# Patient Record
Sex: FEMALE | Race: WHITE | NOT HISPANIC OR LATINO | Employment: OTHER | URBAN - METROPOLITAN AREA
[De-identification: names, ages, dates, MRNs, and addresses within clinical notes are randomized per-mention and may not be internally consistent; named-entity substitution may affect disease eponyms.]

---

## 2017-01-16 ENCOUNTER — RADIANT APPOINTMENT (OUTPATIENT)
Dept: BONE DENSITY | Facility: CLINIC | Age: 67
End: 2017-01-16
Attending: INTERNAL MEDICINE
Payer: COMMERCIAL

## 2017-01-16 DIAGNOSIS — Z00.00 ENCOUNTER FOR ROUTINE ADULT HEALTH EXAMINATION WITHOUT ABNORMAL FINDINGS: ICD-10-CM

## 2017-01-16 PROCEDURE — 77080 DXA BONE DENSITY AXIAL: CPT | Performed by: INTERNAL MEDICINE

## 2017-11-13 ENCOUNTER — HOSPITAL ENCOUNTER (OUTPATIENT)
Dept: MAMMOGRAPHY | Facility: CLINIC | Age: 67
Discharge: HOME | End: 2017-11-13
Attending: OBSTETRICS & GYNECOLOGY | Admitting: OBSTETRICS & GYNECOLOGY
Payer: COMMERCIAL

## 2017-11-13 DIAGNOSIS — Z12.31 VISIT FOR SCREENING MAMMOGRAM: ICD-10-CM

## 2017-11-13 PROCEDURE — G0202 SCR MAMMO BI INCL CAD: HCPCS

## 2017-12-16 DIAGNOSIS — I10 ESSENTIAL HYPERTENSION: ICD-10-CM

## 2017-12-16 RX ORDER — TRIAMTERENE AND HYDROCHLOROTHIAZIDE 37.5; 25 MG/1; MG/1
CAPSULE ORAL
Qty: 90 CAPSULE | Refills: 2 | Status: CANCELLED | OUTPATIENT
Start: 2017-12-16

## 2017-12-18 ENCOUNTER — OFFICE VISIT (OUTPATIENT)
Dept: INTERNAL MEDICINE | Facility: CLINIC | Age: 67
End: 2017-12-18
Payer: COMMERCIAL

## 2017-12-18 VITALS
HEIGHT: 58 IN | BODY MASS INDEX: 28.55 KG/M2 | SYSTOLIC BLOOD PRESSURE: 138 MMHG | WEIGHT: 136 LBS | TEMPERATURE: 97.6 F | OXYGEN SATURATION: 96 % | HEART RATE: 64 BPM | DIASTOLIC BLOOD PRESSURE: 60 MMHG

## 2017-12-18 DIAGNOSIS — M79.674 PAIN IN RIGHT TOE(S): ICD-10-CM

## 2017-12-18 DIAGNOSIS — I10 BENIGN ESSENTIAL HYPERTENSION: ICD-10-CM

## 2017-12-18 DIAGNOSIS — K64.9 HEMORRHOIDS, UNSPECIFIED HEMORRHOID TYPE: ICD-10-CM

## 2017-12-18 DIAGNOSIS — E78.5 HYPERLIPIDEMIA LDL GOAL <130: ICD-10-CM

## 2017-12-18 DIAGNOSIS — Z00.00 ENCOUNTER FOR ROUTINE ADULT HEALTH EXAMINATION WITHOUT ABNORMAL FINDINGS: Primary | ICD-10-CM

## 2017-12-18 PROCEDURE — 82043 UR ALBUMIN QUANTITATIVE: CPT | Performed by: INTERNAL MEDICINE

## 2017-12-18 PROCEDURE — 99397 PER PM REEVAL EST PAT 65+ YR: CPT | Performed by: INTERNAL MEDICINE

## 2017-12-18 PROCEDURE — 80061 LIPID PANEL: CPT | Performed by: INTERNAL MEDICINE

## 2017-12-18 PROCEDURE — 36415 COLL VENOUS BLD VENIPUNCTURE: CPT | Performed by: INTERNAL MEDICINE

## 2017-12-18 PROCEDURE — 80048 BASIC METABOLIC PNL TOTAL CA: CPT | Performed by: INTERNAL MEDICINE

## 2017-12-18 RX ORDER — METRONIDAZOLE 7.5 MG/G
LOTION TOPICAL
Status: CANCELLED | OUTPATIENT
Start: 2017-12-18

## 2017-12-18 RX ORDER — TRIAMTERENE AND HYDROCHLOROTHIAZIDE 37.5; 25 MG/1; MG/1
1 CAPSULE ORAL DAILY
Qty: 90 CAPSULE | Refills: 3 | Status: SHIPPED | OUTPATIENT
Start: 2017-12-18 | End: 2018-12-13

## 2017-12-18 ASSESSMENT — ACTIVITIES OF DAILY LIVING (ADL)
CURRENT_FUNCTION: NO ASSISTANCE NEEDED
I_NEED_ASSISTANCE_FOR_THE_FOLLOWING_DAILY_ACTIVITIES:: NO ASSISTANCE IS NEEDED

## 2017-12-18 NOTE — PATIENT INSTRUCTIONS
PREVENTIVE HEALTH RECOMMENDATIONS:     Vaccines: Get a flu shot each year. Get a tetanus shot every 10 years.     Exercise for at least 150 minutes a week (an average of 30 minutes a day, 5 days of the week). This will help you control your weight and prevent disease.    Limit alcohol to one drink per day.    No smoking.     Wear sunscreen to prevent skin cancer.     See your dentist twice a year for an exam and cleaning.    Try to get Calcium 1200 mg total per day. It is best to not take it all at once. Try to get Vitamin D at least 1000 units per day.    BMI or Body Mass Index is a way of indicating weight and health risk for cardiovascular diseases, high blood pressure, diabetes.   Definitions:    Underweight is less than 18.5 and will be associated with health risk.   Normal BMI is 18.5 to 25   Overweight is 25-29   Obesity is 30 or greater   Morbid Obesity is 40 or greater or 35 or greater with diabetes, high blood pressure or elevated cholesterol  Obesity and Morbid Obesity are associated with higher health risks. Lowering calories, exercising more may lower your BMI and even small decreases can have positive impact on lowering health risks.   Your Body mass index is 28.18 kg/(m^2)..

## 2017-12-18 NOTE — MR AVS SNAPSHOT
After Visit Summary   12/18/2017    Kymberly Espinosa    MRN: [id number]           Patient Information     Date Of Birth          1950        Visit Information        Provider Department      12/18/2017 8:20 AM Kimmie Hayes MD [facility]        Today's Diagnoses     Encounter for routine adult health examination without abnormal findings    -  1    Benign essential hypertension        Hyperlipidemia LDL goal <130        Hemorrhoids, unspecified hemorrhoid type          Care Instructions      PREVENTIVE HEALTH RECOMMENDATIONS:     Vaccines: Get a flu shot each year. Get a tetanus shot every 10 years.     Exercise for at least 150 minutes a week (an average of 30 minutes a day, 5 days of the week). This will help you control your weight and prevent disease.    Limit alcohol to one drink per day.    No smoking.     Wear sunscreen to prevent skin cancer.     See your dentist twice a year for an exam and cleaning.    Try to get Calcium 1200 mg total per day. It is best to not take it all at once. Try to get Vitamin D at least 1000 units per day.    BMI or Body Mass Index is a way of indicating weight and health risk for cardiovascular diseases, high blood pressure, diabetes.   Definitions:    Underweight is less than 18.5 and will be associated with health risk.   Normal BMI is 18.5 to 25   Overweight is 25-29   Obesity is 30 or greater   Morbid Obesity is 40 or greater or 35 or greater with diabetes, high blood pressure or elevated cholesterol  Obesity and Morbid Obesity are associated with higher health risks. Lowering calories, exercising more may lower your BMI and even small decreases can have positive impact on lowering health risks.   Your Body mass index is 28.18 kg/(m^2)..             Follow-ups after your visit        Additional Services     COLORECTAL SURGERY REFERRAL       Your provider has referred you to: [facility] [place] [phone]  [phone]   [email or web address]    Referral Reason(s): Hemorrhoids  Special Concerns: None  This referral is: Elective (week +)  It is OK to leave a message on patient's voicemail.    Please be aware that coverage of these services is subject to the terms and limitations of your health insurance plan.  Call member services at your health plan with any benefit or coverage questions.      Please bring the following with you to your appointment:    (1) Any X-Rays, CTs or MRIs which have been performed.  Contact the facility where they were done to arrange for  prior to your scheduled appointment.    (2) List of current medications  (3) This referral request   (4) Any documents/labs given to you for this referral                  Who to contact     If you have questions or need follow up information about today's clinic visit or your schedule please contact [facility] directly at [phone].  Normal or non-critical lab and imaging results will be communicated to you by MyChart, letter or phone within 4 business days after the clinic has received the results. If you do not hear from us within 7 days, please contact the clinic through [employer] or phone. If you have a critical or abnormal lab result, we will notify you by phone as soon as possible.  Submit refill requests through [employer] or call your pharmacy and they will forward the refill request to us. Please allow 3 business days for your refill to be completed.          Additional Information About Your Visit        [employer] Information     [employer] gives you secure access to your electronic health record. If you see a primary care provider, you can also send messages to your care team and make appointments. If you have questions, please call your primary care clinic.  If you do not have a primary care provider, please call [phone] and they will assist you.        Care EveryWhere ID     This is your Care EveryWhere ID. This could be  "used by other organizations to access your [facility] medical records  [id number]        Your Vitals Were     Pulse Temperature Height Pulse Oximetry BMI (Body Mass Index)       64 97.6  F (36.4  C) (Oral) 4' 10.25\" (1.48 m) 96% 28.18 kg/m2        Blood Pressure from Last 3 Encounters:   12/18/17 138/60   12/15/16 136/60   12/11/15 122/72    Weight from Last 3 Encounters:   12/18/17 136 lb (61.7 kg)   12/15/16 136 lb (61.7 kg)   12/11/15 134 lb 11.2 oz (61.1 kg)              We Performed the Following     Albumin Random Urine Quantitative with Creat Ratio     Basic metabolic panel     COLORECTAL SURGERY REFERRAL     Lipid panel reflex to direct LDL Fasting          Where to get your medicines      These medications were sent to [id number] - [place], [place] - [address]  [address] [id number]     Phone:  [phone]     triamterene-hydrochlorothiazide 37.5-25 MG per capsule          Primary Care Provider Office Phone # Fax #    Kimmie Hayes [phone]       [address] [facility] [address]  [facility] [address]        Equal Access to Services     NICHOLAS SHAW : Hadii aad ku hadasho Soomaali, waaxda luqadaha, qaybta kaalmada adeegyada, mahesh morenoin haykaycee [place]. So [facility] [phone].    ATENCIÓN: Si habla español, tiene a pillai disposición servicios gratuitos de asistencia lingüística. Llame al [phone].    We comply with applicable federal civil rights laws and [place] laws. We do not discriminate on the basis of race, color, national origin, age, disability, sex, sexual orientation, or gender identity.            Thank you!     Thank you for choosing [facility]  for your care. Our goal is always to provide you with excellent care. Hearing back from our patients is one way we can continue to improve our services. Please take a few minutes to complete the written survey that you may receive in the mail after your visit with us. " Thank you!             Your Updated Medication List - Protect others around you: Learn how to safely use, store and throw away your medicines at www.disposemymeds.org.          This list is accurate as of: 12/18/17  9:09 AM.  Always use your most recent med list.                   Brand Name Dispense Instructions for use Diagnosis    aspirin 162 MG EC tablet      Take 1 tablet (162 mg) by mouth daily        metroNIDAZOLE 0.75 % Lotn      Apply to face twice daily        triamterene-hydrochlorothiazide 37.5-25 MG per capsule    DYAZIDE    90 capsule    Take 1 capsule by mouth daily    Benign essential hypertension

## 2017-12-18 NOTE — NURSING NOTE
"Chief Complaint   Patient presents with     Medicare Visit       Initial /60  Pulse 64  Temp 97.6  F (36.4  C) (Oral)  Ht 4' 10.25\" (1.48 m)  Wt 136 lb (61.7 kg)  SpO2 96%  BMI 28.18 kg/m2 Estimated body mass index is 28.18 kg/(m^2) as calculated from the following:    Height as of this encounter: 4' 10.25\" (1.48 m).    Weight as of this encounter: 136 lb (61.7 kg).  Medication Reconciliation: complete    "

## 2017-12-18 NOTE — PROGRESS NOTES
SUBJECTIVE:   Kymberly Espinosa is a 67 year old female who presents for Preventive Visit.      Are you in the first 12 months of your Medicare coverage?  No    Physical   Annual:     Getting at least 3 servings of Calcium per day::  Yes    Bi-annual eye exam::  Yes    Dental care twice a year::  Yes    Sleep apnea or symptoms of sleep apnea::  None    Diet::  Regular (no restrictions)    Taking medications regularly::  Yes    Medication side effects::  None    Additional concerns today::  No    Ability to successfully perform activities of daily living: no assistance needed  Home Safety:  No safety concerns identified  Hearing Impairment: no hearing concerns      Ability to successfully perform activities of daily living: Yes, no assistance needed  Home safety:  none identified   Hearing impairment: No       Fall risk:  Fallen 2 or more times in the past year?: No  Any fall with injury in the past year?: No      COGNITIVE SCREEN  1) Repeat 3 items (Banana, Sunrise, Chair)    2) Clock draw: NORMAL  3) 3 item recall: Recalls 3 objects  Results: 3 items recalled: COGNITIVE IMPAIRMENT LESS LIKELY    Mini-CogTM Copyright ALINE Bates. Licensed by the author for use in [facility]; reprinted with permission (carol@[facility]). All rights reserved.          Reviewed and updated as needed this visit by clinical staffTobacco  Allergies  Med Hx  Surg Hx  Fam Hx  Soc Hx        Reviewed and updated as needed this visit by Provider        Social History   Substance Use Topics     Smoking status: Former Smoker     Smokeless tobacco: Never Used     Alcohol use Yes      Comment: Casual       Alcohol Use 12/18/2017   If you drink alcohol, do you typically have greater than 3 drinks per day OR greater than 7 drinks per week?   No   No flowsheet data found.          Current concerns:   She has had recurrent hemorrhoids, would like to go back to colon rectal    Today's PHQ-2 Score: PHQ-2 ( 1999 [employer]) 12/18/2017   Q1:  Little interest or pleasure in doing things 0   Q2: Feeling down, depressed or hopeless 0   PHQ-2 Score 0   Q1: Little interest or pleasure in doing things Not at all   Q2: Feeling down, depressed or hopeless Not at all   PHQ-2 Score 0       Do you feel safe in your environment - Yes    Do you have a Health Care Directive?: Yes: Advance Directive has been received and scanned.    Current providers sharing in care for this patient include:   Patient Care Team:  Kimmie Hayes MD as PCP - General (Internal Medicine)    The following health maintenance items are reviewed in Epic and correct as of today:  Health Maintenance   Topic Date Due     HEPATITIS C SCREENING  01/12/1969     PNEUMOCOCCAL (1 of 2 - PCV13) 01/12/2016     INFLUENZA VACCINE (SYSTEM ASSIGNED)  09/01/2017     LIPID MONITORING Q1 YEAR  12/15/2017     FALL RISK ASSESSMENT  12/15/2017     MAMMO Q1 YR  11/13/2018     COLONOSCOPY Q10 YR  10/13/2019     ADVANCE DIRECTIVE PLANNING Q5 YRS  12/22/2020     TETANUS IMMUNIZATION (SYSTEM ASSIGNED)  11/21/2022     DEXA SCAN SCREENING (SYSTEM ASSIGNED)  Completed         Review of Systems    Physical Exam    Patient Active Problem List   Diagnosis     HTN (hypertension)     Hyperlipidemia LDL goal <130     Advanced directives, counseling/discussion     Current Outpatient Prescriptions   Medication Sig Dispense Refill     metroNIDAZOLE 0.75 % LOTN Apply to face twice daily       triamterene-hydrochlorothiazide (DYAZIDE) 37.5-25 MG per capsule Take 1 capsule by mouth daily 90 capsule 3     aspirin 162 MG EC tablet Take 1 tablet (162 mg) by mouth daily        Review Of Systems  Skin: saw derm in August, treating rosacea  Eyes: negative  Ears/Nose/Throat: resolving URI  Respiratory: No dyspnea on exertion and No cough  Cardiovascular: negative  Gastrointestinal: negative  Genitourinary: negative  Musculoskeletal: infrequent sore back, more pain right toes 2,3 and 4, more pain now, not tingling, worse after  "walking  Neurologic: negative  Psychiatric: negative  Hematologic/Lymphatic/Immunologic: negative  Endocrine: negative   Gynecologic ros reveals:no breast pain or new or enlarging lumps on self exam, no vaginal bleeding, no discharge or pelvic pain    Objective:  Patient alert, in no acute distress  /60  Pulse 64  Temp 97.6  F (36.4  C) (Oral)  Ht 4' 10.25\" (1.48 m)  Wt 136 lb (61.7 kg)  SpO2 96%  BMI 28.18 kg/m2    HEENT: extraocular movements are intact, pupils equal and reactive to light and accommodation, TMs clear, oropharynx clear  NECK: Neck supple. No adenopathy. Thyroid symmetric, normal size,, Carotids without bruits.  PULMONARY: clear to auscultation  CARDIAC: regular rate and rhythm and no murmurs, clicks, or gallops  PULSES: 2/2 throughout  BACK: no spinal or CVAT  ABDOMINAL: Soft, nontender.  Normal bowel sounds.  No hepatosplenomegaly or abnormal masses  BREAST: No breast masses or tenderness, No axillary masses or tenderness and No galactorrhea  PELVIC: declined  REFLEXES: 2+ throughout  SKIN: unremarkable       ASSESSMENT / PLAN:   1. Encounter for routine adult health examination without abnormal findings    - Basic metabolic panel  - Lipid panel reflex to direct LDL Fasting    2. Benign essential hypertension  Controlled, continue med  - triamterene-hydrochlorothiazide (DYAZIDE) 37.5-25 MG per capsule; Take 1 capsule by mouth daily  Dispense: 90 capsule; Refill: 3  - Albumin Random Urine Quantitative with Creat Ratio    3. Hyperlipidemia LDL goal <130  recheck    4. Hemorrhoids, unspecified hemorrhoid type  refer  - COLORECTAL SURGERY REFERRAL    5. Pain in right toe(s)  Refer podiatry  - ORTHO  REFERRAL    End of Life Planning:  Patient currently has an advanced directive: Yes.  Practitioner is supportive of decision.    COUNSELING:  Reviewed preventive health counseling, as reflected in patient instructions        Estimated body mass index is 28.18 kg/(m^2) as calculated " "from the following:    Height as of this encounter: 4' 10.25\" (1.48 m).    Weight as of this encounter: 136 lb (61.7 kg).     reports that she has quit smoking. She has never used smokeless tobacco.        Appropriate preventive services were discussed with this patient, including applicable screening as appropriate for cardiovascular disease, diabetes, osteopenia/osteoporosis, and glaucoma.  As appropriate for age/gender, discussed screening for colorectal cancer, prostate cancer, breast cancer, and cervical cancer. Checklist reviewing preventive services available has been given to the patient.    Reviewed patients plan of care and provided an AVS. The Basic Care Plan (routine screening as documented in Health Maintenance) for [place] meets the Care Plan requirement. This Care Plan has been established and reviewed with the Patient.    Counseling Resources:  ATP IV Guidelines  Pooled Cohorts Equation Calculator  Breast Cancer Risk Calculator  FRAX Risk Assessment  ICSI Preventive Guidelines  Dietary Guidelines for Americans, 2010  USDA's MyPlate  ASA Prophylaxis  Lung CA Screening    Kimmie Hayes MD  [facility] for HPI/ROS submitted by the patient on 12/18/2017   PHQ-2 Score: 0    "

## 2017-12-19 LAB
ANION GAP SERPL CALCULATED.3IONS-SCNC: 10 MMOL/L (ref 3–14)
BUN SERPL-MCNC: 20 MG/DL (ref 7–30)
CALCIUM SERPL-MCNC: 9.5 MG/DL (ref 8.5–10.1)
CHLORIDE SERPL-SCNC: 105 MMOL/L (ref 94–109)
CHOLEST SERPL-MCNC: 218 MG/DL
CO2 SERPL-SCNC: 25 MMOL/L (ref 20–32)
CREAT SERPL-MCNC: 0.7 MG/DL (ref 0.52–1.04)
CREAT UR-MCNC: 131 MG/DL
GFR SERPL CREATININE-BSD FRML MDRD: 83 ML/MIN/1.7M2
GLUCOSE SERPL-MCNC: 86 MG/DL (ref 70–99)
HDLC SERPL-MCNC: 36 MG/DL
LDLC SERPL CALC-MCNC: 139 MG/DL
MICROALBUMIN UR-MCNC: <5 MG/L
MICROALBUMIN/CREAT UR: NORMAL MG/G CR (ref 0–25)
NONHDLC SERPL-MCNC: 182 MG/DL
POTASSIUM SERPL-SCNC: 3.5 MMOL/L (ref 3.4–5.3)
SODIUM SERPL-SCNC: 140 MMOL/L (ref 133–144)
TRIGL SERPL-MCNC: 215 MG/DL

## 2018-01-02 ENCOUNTER — OFFICE VISIT (OUTPATIENT)
Dept: PODIATRY | Facility: CLINIC | Age: 68
End: 2018-01-02
Payer: COMMERCIAL

## 2018-01-02 VITALS — BODY MASS INDEX: 28.55 KG/M2 | WEIGHT: 136 LBS | HEART RATE: 70 BPM | HEIGHT: 58 IN

## 2018-01-02 DIAGNOSIS — G57.61 NEUROMA OF SECOND INTERSPACE OF RIGHT FOOT: ICD-10-CM

## 2018-01-02 DIAGNOSIS — G57.61 MORTON'S NEUROMA, RIGHT: Primary | ICD-10-CM

## 2018-01-02 PROCEDURE — 99203 OFFICE O/P NEW LOW 30 MIN: CPT | Performed by: PODIATRIST

## 2018-01-02 NOTE — PROGRESS NOTES
"Foot & Ankle Surgery  January 2, 2018    CC: nubm toes on right foot    I was asked to see Kymberly Espinosa regarding the chief complaint by:  Dr. CAROLINA Hayes    HPI:  Pt is a 67 year old female who presents with above complaint.  \"numb toes on right foot, pain in toe area x 1 year\".  Deep ache, tingling described.  Pain 9/10 \"with walking long periods, worse with walking, wearing socks/shoes.  Years ago, had \"numbness\" in toes 2,3, 4, didn't do anything.  Now it's painful, causing her to limp.  Very active, yoga/cardio multiple times per week.  Mild R bunion, not painful per patient report.  No treatment for toe pain/numbness.    ROS:   Pos for CC.  The patient denies current nausea, vomiting, chills, fevers, belly pain, calf pain, chest pain or SOB.  Complete remainder of ROS is otherwise neg.    VITALS:    Vitals:    01/02/18 1410   Pulse: 70   Weight: 136 lb (61.7 kg)   Height: 4' 10.25\" (1.48 m)       PMH:    Past Medical History:   Diagnosis Date     Basal cell carcinoma     left leg     Hemorrhoid      HTN (hypertension)      Hyperlipidemia        SXHX:    Past Surgical History:   Procedure Laterality Date     C NONSPECIFIC PROCEDURE      removal of basal cell ca leg        MEDS:    Current Outpatient Prescriptions   Medication     triamterene-hydrochlorothiazide (DYAZIDE) 37.5-25 MG per capsule     metroNIDAZOLE 0.75 % LOTN     aspirin 162 MG EC tablet     No current facility-administered medications for this visit.        ALL:     Allergies   Allergen Reactions     Aleve [Gnp Naproxen Sodium]      nausea       FMH:    Family History   Problem Relation Age of Onset     CEREBROVASCULAR DISEASE Mother        SocHx:    Social History     Social History     Marital status:      Spouse name: N/A     Number of children: N/A     Years of education: N/A     Occupational History     Not on file.     Social History Main Topics     Smoking status: Former Smoker     Smokeless tobacco: Never Used     Alcohol use " Yes      Comment: Casual     Drug use: No     Sexual activity: Yes     Partners: Male     Other Topics Concern     Not on file     Social History Narrative           EXAMINATION:  Gen:   No apparent distress  Neuro:   A&Ox3, no deficits  Psych:    Answering questions appropriately for age and situation with normal affect  Head:    NCAT  Eye:    Visual scanning without deficit  Ear:    Response to auditory stimuli wnl  Lung:    Non-labored breathing on RA noted  Abd:    NTND per patient report  Lymph:    Neg for pitting/non-pitting edema BLE  Vasc:    Pulses palpable, CFT minimally delayed  Neuro:    Light touch sensation intact to all sensory nerve distributions with some paresthesias in right toes 2,3,4  Derm:    Neg for nodules, lesions or ulcerations  MSK:    R foot - minimal metatarsal or MPJ pain, but very tender with + click 3rd interspace.  Minimal 2nd interspace pain noted.  Low-grade clinical bunion, not symptomatic per patient.  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  Neuroma 3rd > 2nd interspace right lower extremity   -reviewed anatomy with patient, including nerve, MPJ, metatarsals  -reviewed indirect role bunion can play in neuroma pain  -comfortable accommodative shoe gear  -OTC inserts  -RICE/NSAID prn  -briefly discussed steroid injections; consider if no improvement noted    Follow up:  3 weeks or sooner with acute issues      Patient's medical history was reviewed today    Body mass index is 28.18 kg/(m^2).  Weight management plan: Patient was referred to their PCP to discuss a diet and exercise plan.        Sagar Martinez DPM   Podiatric Foot & Ankle Surgeon  [facility]  [phone]

## 2018-01-02 NOTE — PATIENT INSTRUCTIONS
Thank you for choosing [facility]!    DR. WILSON'S CLINIC LOCATIONS:   MONDAY - [place] TUESDAY - [place]   [address] [facility]  [address]   [place] [address] [place] [address]   [phone]       THURSDAY AM - [place] THURSDAY PM - [place]   [address] [facility] [address]   [place] [address] [place] [address]   [phone]       FRIDAY AM - [place] SET UP SURGERY: [phone] [address] APPOINTMENTS: [phone]   [place] [address] BILLING QUESTIONS: [phone] FAX NUMBER: [phone]     Follow Up: 3 wks    MARTIN'S NEUROMA   Martin's neuroma is an enlargement or thickening of a nerve in the foot. It is also sometimes referred to as an intermetatarsal neuroma, interdigital neuroma, Martin's metatarsalgia (pain in the metatarsal head area), jay jay-neural fibrosis (scar tissue around a nerve) or entrapment neuropathy (abnormal nerve due to compression). A Martin's neuroma most commonly occurs in the third interspace between the third and fourth toes, followed by the second interspace between the second and third toes. Martin's neuromas have also occurred in the fourth and first interspaces, but these are rare. If you have a Martin's neuroma, there is a 15% chance it will occur bilaterally (on both feet). Martin's neuromas occur most commonly in women who are between 30 to 50 years old. The reason they are more common in women is thought to be due to the shoes women wear.   CAUSES: A Martin's neuroma is thought to be caused by trauma to the nerve, but scientists are still not sure about the exact cause of the trauma. The trauma may be caused by the metatarsal heads, the deep transverse intermetatarsal ligament (holds the metatarsal heads together) or an intermetatarsal bursa (fluid-filled sac). All of these structures can cause compression/trauma on the nerve which initially causes  "swelling and injury in the nerve. Over time if the compression/trauma continues, the nerve repairs itself with very fibrous tissue that leads to enlargement and thickening of the nerve. Other causes of trauma to the nerve may include; overpronation (foot rolls inward), hypermobility (too much motion), cavo varus (high arch foot) and excessive dorsiflexion (toes bend upward) of the toes. These biomechanical (howthe foot moves) factors may cause trauma to the nerve with every step. If the nerve becomes irritated and enlarged then it takes up more space and gets even more compressed and irritated. It becomes a vicious cycle.   SIGNS & SYMPTOMS  - Pain (sharp, stabbing, throbbing, shooting)   - Numbness   - Tingling or \"pins & needles\"   - Burning   - Cramping   - Feeling that you are stepping on something or that something is in your shoe   - Initially the symptoms may happen once in a while, but as the condition gets     worse, the symptoms may happen all of the time   - It usually feels better by taking off your shoe and massaging your foot     DIAGNOSIS: Your podiatrist will ask many questions about your signs and symptoms and will perform a physical exam. Some of the exams may include a web space compression test. This is done by squeezing the metatarsals together with one hand and using the thumb and index finger of the other hand to compress the affected web space to reproduce the pain/symptoms. A palpable click (Geovanni's click) is usually present. This test may also cause pain to shoot into the toes and that is called a Tinel's sign. Carlos's test involves squeezing the metatarsals together and moving the toes up and down for 30 seconds. This will usually cause pain or it will bring on your other symptoms. Corona's sign is positive when you stand and the affected toes spread apart. A Martin's neuroma is usually diagnosed based on the history and physical exam findings, but sometimes other tests such as an " x-ray, ultrasound or an MRI are needed.   TREATMENT  1.  Footwear Changes: Wear shoes that are wide and deep in the toe box so they  do not put pressure on your toes and metatarsals. Avoid wearing high heels because they cause increased pressure on the ball of your foot (forefoot).    2.  Metatarsal Pads: These help to lift and separate the metatarsal heads to take pressure off of the nerve. They are placed just behind where you feel the pain, not on top of the painful spot.   3.  Activity modification: For example, you may try swimming instead of running until your symptoms go away.   4.  Taping   5.  Icing   6.  NSAIDs (anti-inflammatories): aleve, ibuprofen, etc.   7.  Arch Supports or Orthotics: These help to control some of the abnormal motion in your feet. The abnormal motion can lead to extra torque and pressure on the nerve.   8.  Physical Therapy  9.  Cortisone Injection: Helps to decrease the size of the irritated, enlarged nerve.   10.  Sclerosing Alcohol Injection: Helps to destroy the nerve chemically, which causes permanent numbness    SURGERY  If conservative treatment does not help surgery may be needed. Surgery may involve cutting out the nerve or cutting the intermetatarsalligament. Studies have shown surgery has an 80-85% success rate.  Will result in numbness    PREVENTION  -Avoid wearing narrow, pointed toe shoes, or high heels    Over the Counter Inserts    Super Feet are the most common and easiest to find.    Locations include any [employer], [employer] in [place] on [address] [address] and in [place] on [address], [facility] in [facility] on [facility], [facility] Running Room in [facility], [facility] Running Room in [facility], [address], [employer] in [place] on [address] and [employer] in [facility] on [place] and in [place] on [address].    Sofsole Fit can be found at [employer] in  [place].  You can also find them online at Sofsole.com    Spenco can be found online and at [employer]oe [employer] in [facility] on [address], [employer] in [facility] on [address], Gear Running Store in [place] on [address], [employer] [employer] in [employer] on [address] and [employer] in [place] on [address].    Power Step can be a little harder to find.  Locations include [employer] in [employer] on [address], [place] in [facility], [employer] Store in [employer] on [address] and online    Walk-Fit - [employer]     Arch [facility] [place]    **  A good high quality over the counter insert can cost around $40-$50.          PRICE THERAPY  Many aches and pains throughout the foot and ankle can be helped with many simple treatments. This is usually described as PRICE Therapy.      P - Protection - often times, inflammation/pain in the lower extremity is not able to improve simply because the areas involved are never allowed to rest. Every step we take can bother the problematic area. Protecting those areas is an important step in the healing process. This may involve a walking cast boot, a special insert/orthotic device, an ankle brace, or simply avoiding barefoot walking.    R - Rest - in addition to protecting the foot/ankle, resting is an important, but often times difficult, treatment option. Getting off your feet when they bother you, and specifically avoiding activities that cause pain/discomfort, are very beneficial to prevent, and treat, foot/ankle pain.      I - Ice - icing regularly can help to decrease inflammation and swelling in the foot, thus decreasing pain. Using an ice pack or a bag of frozen veggies works very well. Ice for 20 minutes multiple times per day as needed.  Do not place the ice directly on the skin as this can cause tissue damage.    C - Compression - using a compression wrap or an ACE wrap can help to decrease swelling, which can help to decrease pain. Wearing the wraps is generally  not needed at night, but they should be worn on a regular basis when you are going to be on your feet for prolonged periods as gravity tends to pull fluids down to your feet/ankles.    E - Elevation - elevating your lower extremities multiple times daily for 15-20 minutes can help to decrease swelling, which works well in decreasing pain levels.    NSAID/Tylenol - Anti-inflammatories like Aleve or ibuprofen, and/or a pain medication, such as Tylenol, can help to improve pain levels and get the issue resolved sooner rather than later. Anyone with liver issues should be careful with Tylenol, and anyone with high blood pressure or heart, stomach or kidney issues should be careful with anti-inflammatories. Please ask if you have questions about these medications, including dosage.      Body Mass Index (BMI)  Many things can cause foot and ankle problems. Foot structure, activity level, foot mechanics and injuries are common causes of pain. One very important issue that often goes unmentioned, is body weight. Extra weight can cause increased stress on muscles, ligaments, bones and tendons. Sometimes just a few extra pounds is all it takes to put one over her/his threshold. Without reducing that stress, it can be difficult to alleviate pain. Some people are uncomfortable addressing this issue, but we feel it is important for you to think about it. As Foot &  Ankle specialists, our job is addressing the lower extremity problem and possible causes. Regarding extra body weight, we encourage patients to discuss diet and weight management plans with their primary care doctors. It is this team approach that gives you the best opportunity for pain relief and getting you back on your feet.

## 2018-01-02 NOTE — MR AVS SNAPSHOT
After Visit Summary   1/2/2018    Kymberly Espinosa    MRN: [id number]           Patient Information     Date Of Birth          1950        Visit Information        Provider Department      1/2/2018 2:15 PM Sagar Wilson DPM [facility] PODIATRY        Care Instructions    Thank you for choosing [facility]!    DR. WILSON'S CLINIC LOCATIONS:   MONDAY - [place] TUESDAY - [place]   [address] [facility]  [address]   [place] [address] [place] [address]   [phone]       THURSDAY AM - [place] THURSDAY PM - [place]   [address]   [place] [address] [place] [address]   [phone]       FRIDAY AM - [place] SET UP SURGERY: [phone] [address] APPOINTMENTS: [phone]   [place] [address] BILLING QUESTIONS: [phone] FAX NUMBER: [phone]     Follow Up: 3 wks    MARTIN'S NEUROMA   Martin's neuroma is an enlargement or thickening of a nerve in the foot. It is also sometimes referred to as an intermetatarsal neuroma, interdigital neuroma, Martin's metatarsalgia (pain in the metatarsal head area), jay jay-neural fibrosis (scar tissue around a nerve) or entrapment neuropathy (abnormal nerve due to compression). A Martin's neuroma most commonly occurs in the third interspace between the third and fourth toes, followed by the second interspace between the second and third toes. Martin's neuromas have also occurred in the fourth and first interspaces, but these are rare. If you have a Martin's neuroma, there is a 15% chance it will occur bilaterally (on both feet). Martin's neuromas occur most commonly in women who are between 30 to 50 years old. The reason they are more common in women is thought to be due to the shoes women wear.   CAUSES: A Martin's neuroma is thought to be caused by trauma to the nerve, but scientists are still not sure  "about the exact cause of the trauma. The trauma may be caused by the metatarsal heads, the deep transverse intermetatarsal ligament (holds the metatarsal heads together) or an intermetatarsal bursa (fluid-filled sac). All of these structures can cause compression/trauma on the nerve which initially causes swelling and injury in the nerve. Over time if the compression/trauma continues, the nerve repairs itself with very fibrous tissue that leads to enlargement and thickening of the nerve. Other causes of trauma to the nerve may include; overpronation (foot rolls inward), hypermobility (too much motion), cavo varus (high arch foot) and excessive dorsiflexion (toes bend upward) of the toes. These biomechanical (howthe foot moves) factors may cause trauma to the nerve with every step. If the nerve becomes irritated and enlarged then it takes up more space and gets even more compressed and irritated. It becomes a vicious cycle.   SIGNS & SYMPTOMS  - Pain (sharp, stabbing, throbbing, shooting)   - Numbness   - Tingling or \"pins & needles\"   - Burning   - Cramping   - Feeling that you are stepping on something or that something is in your shoe   - Initially the symptoms may happen once in a while, but as the condition gets     worse, the symptoms may happen all of the time   - It usually feels better by taking off your shoe and massaging your foot     DIAGNOSIS: Your podiatrist will ask many questions about your signs and symptoms and will perform a physical exam. Some of the exams may include a web space compression test. This is done by squeezing the metatarsals together with one hand and using the thumb and index finger of the other hand to compress the affected web space to reproduce the pain/symptoms. A palpable click (Geovanni's click) is usually present. This test may also cause pain to shoot into the toes and that is called a Tinel's sign. Carlos's test involves squeezing the metatarsals together and moving the " toes up and down for 30 seconds. This will usually cause pain or it will bring on your other symptoms. Corona's sign is positive when you stand and the affected toes spread apart. A Martin's neuroma is usually diagnosed based on the history and physical exam findings, but sometimes other tests such as an x-ray, ultrasound or an MRI are needed.   TREATMENT  1.  Footwear Changes: Wear shoes that are wide and deep in the toe box so they  do not put pressure on your toes and metatarsals. Avoid wearing high heels because they cause increased pressure on the ball of your foot (forefoot).    2.  Metatarsal Pads: These help to lift and separate the metatarsal heads to take pressure off of the nerve. They are placed just behind where you feel the pain, not on top of the painful spot.   3.  Activity modification: For example, you may try swimming instead of running until your symptoms go away.   4.  Taping   5.  Icing   6.  NSAIDs (anti-inflammatories): aleve, ibuprofen, etc.   7.  Arch Supports or Orthotics: These help to control some of the abnormal motion in your feet. The abnormal motion can lead to extra torque and pressure on the nerve.   8.  Physical Therapy  9.  Cortisone Injection: Helps to decrease the size of the irritated, enlarged nerve.   10.  Sclerosing Alcohol Injection: Helps to destroy the nerve chemically, which causes permanent numbness    SURGERY  If conservative treatment does not help surgery may be needed. Surgery may involve cutting out the nerve or cutting the intermetatarsalligament. Studies have shown surgery has an 80-85% success rate.  Will result in numbness    PREVENTION  -Avoid wearing narrow, pointed toe shoes, or high heels    Over the Counter Inserts    Super Feet are the most common and easiest to find.    Locations include any [employer], [employer] in [place] on [address] and in [place] on [address], [employer] in [facility] on [address]  Running Room in hospitals on [address], [facility] Running Room in [place] on [address], [employer] in [place] on [address] and [employer] Sport Shop in hospitals on [place] and in [place] on [address].    Sofsole Fit can be found at [employer] in [place].  You can also find them online at Sofsole.com    Spenco can be found online and at [employer] Shoe Shop in hospitals on [address], [employer] in [facility] on [place], Gear Running Store in [place] on [address], [employer] in [place] on [address] and [employer] in [place] on [address].    Power Step can be a little harder to find.  Locations include [employer] in [place] on [place], [place] in hospitals, [employer]over Store in [place] on [employer] and online    Walk-Fit - Target     [facility]    **  A good high quality over the counter insert can cost around $40-$50.          PRICE THERAPY  Many aches and pains throughout the foot and ankle can be helped with many simple treatments. This is usually described as PRICE Therapy.      P - Protection - often times, inflammation/pain in the lower extremity is not able to improve simply because the areas involved are never allowed to rest. Every step we take can bother the problematic area. Protecting those areas is an important step in the healing process. This may involve a walking cast boot, a special insert/orthotic device, an ankle brace, or simply avoiding barefoot walking.    R - Rest - in addition to protecting the foot/ankle, resting is an important, but often times difficult, treatment option. Getting off your feet when they bother you, and specifically avoiding activities that cause pain/discomfort, are very beneficial to prevent, and treat, foot/ankle pain.      I - Ice - icing regularly can help to decrease inflammation and swelling in the foot, thus decreasing pain. Using an ice pack or a bag of frozen veggies  works very well. Ice for 20 minutes multiple times per day as needed.  Do not place the ice directly on the skin as this can cause tissue damage.    C - Compression - using a compression wrap or an ACE wrap can help to decrease swelling, which can help to decrease pain. Wearing the wraps is generally not needed at night, but they should be worn on a regular basis when you are going to be on your feet for prolonged periods as gravity tends to pull fluids down to your feet/ankles.    E - Elevation - elevating your lower extremities multiple times daily for 15-20 minutes can help to decrease swelling, which works well in decreasing pain levels.    NSAID/Tylenol - Anti-inflammatories like Aleve or ibuprofen, and/or a pain medication, such as Tylenol, can help to improve pain levels and get the issue resolved sooner rather than later. Anyone with liver issues should be careful with Tylenol, and anyone with high blood pressure or heart, stomach or kidney issues should be careful with anti-inflammatories. Please ask if you have questions about these medications, including dosage.      Body Mass Index (BMI)  Many things can cause foot and ankle problems. Foot structure, activity level, foot mechanics and injuries are common causes of pain. One very important issue that often goes unmentioned, is body weight. Extra weight can cause increased stress on muscles, ligaments, bones and tendons. Sometimes just a few extra pounds is all it takes to put one over her/his threshold. Without reducing that stress, it can be difficult to alleviate pain. Some people are uncomfortable addressing this issue, but we feel it is important for you to think about it. As Foot &  Ankle specialists, our job is addressing the lower extremity problem and possible causes. Regarding extra body weight, we encourage patients to discuss diet and weight management plans with their primary care doctors. It is this team approach that gives you the best  "opportunity for pain relief and getting you back on your feet.            Follow-ups after your visit        Who to contact     If you have questions or need follow up information about today's clinic visit or your schedule please contact [facility] PODIATRY directly at [phone].  Normal or non-critical lab and imaging results will be communicated to you by MyChart, letter or phone within 4 business days after the clinic has received the results. If you do not hear from us within 7 days, please contact the clinic through [employer] or phone. If you have a critical or abnormal lab result, we will notify you by phone as soon as possible.  Submit refill requests through [employer] or call your pharmacy and they will forward the refill request to us. Please allow 3 business days for your refill to be completed.          Additional Information About Your Visit        MyChart Information     [employer] gives you secure access to your electronic health record. If you see a primary care provider, you can also send messages to your care team and make appointments. If you have questions, please call your primary care clinic.  If you do not have a primary care provider, please call [phone] and they will assist you.        Care EveryWhere ID     This is your Care EveryWhere ID. This could be used by other organizations to access your [place] medical records  [id number]        Your Vitals Were     Pulse Height BMI (Body Mass Index)             70 4' 10.25\" (1.48 m) 28.18 kg/m2          Blood Pressure from Last 3 Encounters:   12/18/17 138/60   12/15/16 136/60   12/11/15 122/72    Weight from Last 3 Encounters:   01/02/18 136 lb (61.7 kg)   12/18/17 136 lb (61.7 kg)   12/15/16 136 lb (61.7 kg)              Today, you had the following     No orders found for display       Primary Care Provider Office Phone # Fax #    Kimmie Hayes [phone]       [address] [place] [address]        Equal " Access to Services     [facility]: Hadii aad ku hadbradleyroberto Manuelali, wacarlitada luqyanira, qabarney bishopjavymahesh duval. So [facility] [phone].    ATENCIÓN: Si habla español, tiene a pillai disposición servicios gratuitos de asistencia lingüística. Llame al [phone].    We comply with applicable federal civil rights laws and [place] laws. We do not discriminate on the basis of race, color, national origin, age, disability, sex, sexual orientation, or gender identity.            Thank you!     Thank you for choosing [facility] PODIATRY  for your care. Our goal is always to provide you with excellent care. Hearing back from our patients is one way we can continue to improve our services. Please take a few minutes to complete the written survey that you may receive in the mail after your visit with us. Thank you!             Your Updated Medication List - Protect others around you: Learn how to safely use, store and throw away your medicines at www.disposemymeds.org.          This list is accurate as of: 1/2/18  2:37 PM.  Always use your most recent med list.                   Brand Name Dispense Instructions for use Diagnosis    aspirin 162 MG EC tablet      Take 1 tablet (162 mg) by mouth daily        metroNIDAZOLE 0.75 % Lotn      Apply to face twice daily        triamterene-hydrochlorothiazide 37.5-25 MG per capsule    DYAZIDE    90 capsule    Take 1 capsule by mouth daily    Benign essential hypertension

## 2018-01-02 NOTE — NURSING NOTE
"Chief Complaint   Patient presents with     Foot Problems     R toes 2-4 x 1 year, numbess in those toes, cannot walk much, deep ache       Initial Pulse 70  Ht 4' 10.25\" (1.48 m)  Wt 136 lb (61.7 kg)  BMI 28.18 kg/m2 Estimated body mass index is 28.18 kg/(m^2) as calculated from the following:    Height as of this encounter: 4' 10.25\" (1.48 m).    Weight as of this encounter: 136 lb (61.7 kg).  Medication Reconciliation: complete    Cecilia Santiago CMA (AAMA)  Podiatry / Foot & Ankle Surgery  [facility]    "

## 2018-01-02 NOTE — LETTER
"    1/2/2018         RE: Kymberly Espinosa  [address] [place] [address]        Dear Colleague,    Thank you for referring your patient, Kymberly Espinosa, to the [facility]. Please see a copy of my visit note below.    Foot & Ankle Surgery  January 2, 2018    CC: nubm toes on right foot    I was asked to see Kymberly Ericksonosbaldo regarding the chief complaint by:  Dr. CAROLINA Hayes    HPI:  Pt is a 67 year old female who presents with above complaint.  \"numb toes on right foot, pain in toe area x 1 year\".  Deep ache, tingling described.  Pain 9/10 \"with walking long periods, worse with walking, wearing socks/shoes.  Years ago, had \"numbness\" in toes 2,3, 4, didn't do anything.  Now it's painful, causing her to limp.  Very active, yoga/cardio multiple times per week.  Mild R bunion, not painful per patient report.  No treatment for toe pain/numbness.    ROS:   Pos for CC.  The patient denies current nausea, vomiting, chills, fevers, belly pain, calf pain, chest pain or SOB.  Complete remainder of ROS is otherwise neg.    VITALS:    Vitals:    01/02/18 1410   Pulse: 70   Weight: 136 lb (61.7 kg)   Height: 4' 10.25\" (1.48 m)       PMH:    Past Medical History:   Diagnosis Date     Basal cell carcinoma     left leg     Hemorrhoid      HTN (hypertension)      Hyperlipidemia        SXHX:    Past Surgical History:   Procedure Laterality Date     C NONSPECIFIC PROCEDURE      removal of basal cell ca leg        MEDS:    Current Outpatient Prescriptions   Medication     triamterene-hydrochlorothiazide (DYAZIDE) 37.5-25 MG per capsule     metroNIDAZOLE 0.75 % LOTN     aspirin 162 MG EC tablet     No current facility-administered medications for this visit.        ALL:     Allergies   Allergen Reactions     Aleve [Gnp Naproxen Sodium]      nausea       FMH:    Family History   Problem Relation Age of Onset     CEREBROVASCULAR DISEASE Mother        SocHx:    Social History     Social History     Marital " status:      Spouse name: N/A     Number of children: N/A     Years of education: N/A     Occupational History     Not on file.     Social History Main Topics     Smoking status: Former Smoker     Smokeless tobacco: Never Used     Alcohol use Yes      Comment: Casual     Drug use: No     Sexual activity: Yes     Partners: Male     Other Topics Concern     Not on file     Social History Narrative           EXAMINATION:  Gen:   No apparent distress  Neuro:   A&Ox3, no deficits  Psych:    Answering questions appropriately for age and situation with normal affect  Head:    NCAT  Eye:    Visual scanning without deficit  Ear:    Response to auditory stimuli wnl  Lung:    Non-labored breathing on RA noted  Abd:    NTND per patient report  Lymph:    Neg for pitting/non-pitting edema BLE  Vasc:    Pulses palpable, CFT minimally delayed  Neuro:    Light touch sensation intact to all sensory nerve distributions with some paresthesias in right toes 2,3,4  Derm:    Neg for nodules, lesions or ulcerations  MSK:    R foot - minimal metatarsal or MPJ pain, but very tender with + click 3rd interspace.  Minimal 2nd interspace pain noted.  Low-grade clinical bunion, not symptomatic per patient.  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  Neuroma 3rd > 2nd interspace right lower extremity   -reviewed anatomy with patient, including nerve, MPJ, metatarsals  -reviewed indirect role bunion can play in neuroma pain  -comfortable accommodative shoe gear  -OTC inserts  -RICE/NSAID prn  -briefly discussed steroid injections; consider if no improvement noted    Follow up:  3 weeks or sooner with acute issues      Patient's medical history was reviewed today    Body mass index is 28.18 kg/(m^2).  Weight management plan: Patient was referred to their PCP to discuss a diet and exercise  plan.        Sagar Martinez DPM   Podiatric Foot & Ankle Surgeon  [facility]  [phone]      Again, thank you for allowing me to participate in the care of your patient.        Sincerely,        Sagar Martinez DPM, DPM

## 2018-02-28 ENCOUNTER — TRANSFERRED RECORDS (OUTPATIENT)
Dept: HEALTH INFORMATION MANAGEMENT | Facility: CLINIC | Age: 68
End: 2018-02-28

## 2018-04-24 ENCOUNTER — HOSPITAL ENCOUNTER (OUTPATIENT)
Facility: CLINIC | Age: 68
Discharge: HOME | End: 2018-04-24
Attending: COLON & RECTAL SURGERY | Admitting: COLON & RECTAL SURGERY
Payer: COMMERCIAL

## 2018-04-24 VITALS
RESPIRATION RATE: 16 BRPM | DIASTOLIC BLOOD PRESSURE: 67 MMHG | OXYGEN SATURATION: 94 % | HEIGHT: 59 IN | BODY MASS INDEX: 27.42 KG/M2 | SYSTOLIC BLOOD PRESSURE: 134 MMHG | WEIGHT: 136 LBS

## 2018-04-24 LAB — COLONOSCOPY: NORMAL

## 2018-04-24 PROCEDURE — 88305 TISSUE EXAM BY PATHOLOGIST: CPT | Mod: 26 | Performed by: COLON & RECTAL SURGERY

## 2018-04-24 PROCEDURE — 25000128 H RX IP 250 OP 636: Performed by: COLON & RECTAL SURGERY

## 2018-04-24 PROCEDURE — 45385 COLONOSCOPY W/LESION REMOVAL: CPT | Mod: PT | Performed by: COLON & RECTAL SURGERY

## 2018-04-24 PROCEDURE — 88305 TISSUE EXAM BY PATHOLOGIST: CPT | Performed by: COLON & RECTAL SURGERY

## 2018-04-24 PROCEDURE — G0500 MOD SEDAT ENDO SERVICE >5YRS: HCPCS | Performed by: COLON & RECTAL SURGERY

## 2018-04-24 PROCEDURE — 99153 MOD SED SAME PHYS/QHP EA: CPT | Performed by: COLON & RECTAL SURGERY

## 2018-04-24 RX ORDER — ONDANSETRON 4 MG/1
4 TABLET, ORALLY DISINTEGRATING ORAL EVERY 6 HOURS PRN
Status: DISCONTINUED | OUTPATIENT
Start: 2018-04-24 | End: 2018-04-24 | Stop reason: HOSPADM

## 2018-04-24 RX ORDER — FLUMAZENIL 0.1 MG/ML
0.2 INJECTION, SOLUTION INTRAVENOUS
Status: DISCONTINUED | OUTPATIENT
Start: 2018-04-24 | End: 2018-04-24 | Stop reason: HOSPADM

## 2018-04-24 RX ORDER — FENTANYL CITRATE 50 UG/ML
INJECTION, SOLUTION INTRAMUSCULAR; INTRAVENOUS PRN
Status: DISCONTINUED | OUTPATIENT
Start: 2018-04-24 | End: 2018-04-24 | Stop reason: HOSPADM

## 2018-04-24 RX ORDER — ONDANSETRON 2 MG/ML
4 INJECTION INTRAMUSCULAR; INTRAVENOUS EVERY 6 HOURS PRN
Status: DISCONTINUED | OUTPATIENT
Start: 2018-04-24 | End: 2018-04-24 | Stop reason: HOSPADM

## 2018-04-24 RX ORDER — ONDANSETRON 2 MG/ML
4 INJECTION INTRAMUSCULAR; INTRAVENOUS
Status: DISCONTINUED | OUTPATIENT
Start: 2018-04-24 | End: 2018-04-24 | Stop reason: HOSPADM

## 2018-04-24 RX ORDER — NALOXONE HYDROCHLORIDE 0.4 MG/ML
.1-.4 INJECTION, SOLUTION INTRAMUSCULAR; INTRAVENOUS; SUBCUTANEOUS
Status: DISCONTINUED | OUTPATIENT
Start: 2018-04-24 | End: 2018-04-24 | Stop reason: HOSPADM

## 2018-04-24 RX ORDER — LIDOCAINE 40 MG/G
CREAM TOPICAL
Status: DISCONTINUED | OUTPATIENT
Start: 2018-04-24 | End: 2018-04-24 | Stop reason: HOSPADM

## 2018-04-24 NOTE — OP NOTE
See Provation Note In Chart    Tiffany Saucedo MD  [facility]  Office Phone # [phone]

## 2018-04-24 NOTE — DISCHARGE INSTRUCTIONS
Understanding Colon and Rectal Polyps     The colon has a smooth lining composed of millions of cells.     The colon (also called the large intestine) is a muscular tube that forms the last part of the digestive tract. It absorbs water and stores food waste. The colon is about 4 to 6 feet long. The rectum is the last 6 inches of the colon. The colon and rectum have a smooth lining composed of millions of cells. Changes in these cells can lead to growths in the colon that can become cancerous and should be removed.     When the Colon Lining Changes  Changes that occur in the cells that line the colon or rectum can lead to growths called polyps. Over a period of years, polyps can turn cancerous. Removing polyps early may prevent cancer from ever forming.      Polyps  Polyps are fleshy clumps of tissue that form on the lining of the colon or rectum. Small polyps are usually benign (not cancerous). However, over time, cells in a polyp can change and become cancerous. The larger a polyp grows, the more likely this is to happen. Also, certain types of polyps known as adenomatous polyps are considered premalignant. This means that they will almost always become cancerous if they re not removed.          Cancer  Almost all colorectal cancers start when polyp cells begin growing abnormally. As a cancerous tumor grows, it may involve more and more of the colon or rectum. In time, cancer can also grow beyond the colon or rectum and spread to nearby organs or to glands called lymph nodes. The cells can also travel to other parts of the body. This is known as metastasis. The earlier a cancerous tumor is removed, the better the chance of preventing its spread.        1462-6335 [facility], [address] [place] [address], [place] [address]. All rights reserved. This information is not intended as a substitute for professional medical care. Always follow your healthcare professional's instructions.        Understanding Colon and  Rectal Polyps     The colon has a smooth lining composed of millions of cells.     The colon (also called the large intestine) is a muscular tube that forms the last part of the digestive tract. It absorbs water and stores food waste. The colon is about 4 to 6 feet long. The rectum is the last 6 inches of the colon. The colon and rectum have a smooth lining composed of millions of cells. Changes in these cells can lead to growths in the colon that can become cancerous and should be removed.     When the Colon Lining Changes  Changes that occur in the cells that line the colon or rectum can lead to growths called polyps. Over a period of years, polyps can turn cancerous. Removing polyps early may prevent cancer from ever forming.      Polyps  Polyps are fleshy clumps of tissue that form on the lining of the colon or rectum. Small polyps are usually benign (not cancerous). However, over time, cells in a polyp can change and become cancerous. The larger a polyp grows, the more likely this is to happen. Also, certain types of polyps known as adenomatous polyps are considered premalignant. This means that they will almost always become cancerous if they re not removed.          Cancer  Almost all colorectal cancers start when polyp cells begin growing abnormally. As a cancerous tumor grows, it may involve more and more of the colon or rectum. In time, cancer can also grow beyond the colon or rectum and spread to nearby organs or to glands called lymph nodes. The cells can also travel to other parts of the body. This is known as metastasis. The earlier a cancerous tumor is removed, the better the chance of preventing its spread.        4506-0058 [facility], [address] [place] [address], [place] [address]. All rights reserved. This information is not intended as a substitute for professional medical care. Always follow your healthcare professional's instructions.      Understanding Diverticulosis and Diverticulitis      Pouches or diverticula usually occur in the lower part of the colon called the sigmoid.      Diverticulitis occurs when the pouches become inflamed.     The colon (large intestine) is the last part of the digestive tract. It absorbs water from stool and changes it from a liquid to a solid. In certain cases, small pouches called diverticula can form in the colon wall. This condition is called diverticulosis. The pouches can become infected. If this happens, it becomes a more serious problem called diverticulitis. These problems can be painful. But they can be managed.   Managing Your Condition  Diet changes or taking medications are often tried first. These may be enough to bring relief. If the case is bad, surgery may be done. You and your doctor can discuss the plan that is best for you.  If You Have Diverticulosis  Diet changes are often enough to control symptoms. The main changes are adding fiber (roughage) and drinking more water. Fiber absorbs water as it travels through your colon. This helps your stool stay soft and move smoothly. Water helps this process. If needed, you may be told to take over-the-counter stool softeners. To help relieve pain, antispasmodic medications may be prescribed.  If You Have Diverticulitis  Treatment depends on how bad your symptoms are.  For mild symptoms: You may be put on a liquid diet for a short time. You may also be prescribed antibiotics. If these two steps relieve your symptoms, you may then be prescribed a high-fiber diet. If you still have symptoms, your doctor will discuss further treatment options with you.  For severe symptoms: You may need to be admitted to the hospital. There, you can be given IV antibiotics and fluids. Once symptoms are under control, the above treatments may be tried. If these don t control your condition, your doctor may discuss the option of having surgery with you.  [place] to Colon Health  Help keep your colon healthy with a diet that includes  plenty of high-fiber fruits, vegetables, and whole grains. Drink plenty of liquids like water and juice. Your doctor may also recommend avoiding seeds and nuts.          [id number] [facility], [address] [place] [address], [place] [address]. All rights reserved. This information is not intended as a substitute for professional medical care. Always follow your healthcare professional's instructions.      HIGH FIBER DIET  Fiber is present in all fruits, vegetables, cereals and grains. Fiber passes through the body undigested. A high fiber diet helps food move through the intestinal tract. The added bulk is helpful in preventing constipation. In people with diverticulosis it serves to clean out the pouches along the colon wall while preventing new ones from forming. A high fiber diet also reduces the risk of colon cancer, decreases blood cholesterol and prevents high blood sugar in people with diabetes.    The foods listed below are high in fiber and should be included in your diet. If you are not used to high fiber foods, start with 1 or 2 foods from this list. Every 3-4 days add a new one to your diet until you are eating 4 high fiber foods per day. This should give you 20-35 Gm of fiber/day. It is also important to drink a lot of water when you are on this diet (6-8 glasses a day). Water causes the fiber to swell and increases the benefit.    FOODS HIGH IN DIETARY FIBER:  BREADS: Made with 100% whole wheat flour; francine, wheat or rye crackers; tortillas, bran muffins  CEREALS: Whole grain cereal with bran (Chex, Raisin Bran, Corn Bran), oatmeal, rolled oats, granola, wheat flakes, brown rice  NUTS: Any nuts  FRUITS: All fresh fruits along with edible skins, (bananas, citrus fruit, mangoes, pears, prunes, raisins, apples, pineapple, apricot, melon, jams and marmalades), fruit juices (especially prune juice)  VEGETABLES: All types, preferably raw or lightly cooked: especially, celery, eggplant, potatoes, spinach,  broccoli, brussel sprouts, winter squash, carrots, cauliflower, soybeans, lentils, fresh and dried beans of all kinds  OTHER: Popcorn, any spices      2913-9859 [facility], [address] [place] [address], [place] [address]. All rights reserved. This information is not intended as a substitute for professional medical care. Always follow your healthcare professional's instructions.

## 2018-04-24 NOTE — H&P
Pre-Endoscopy History and Physical     Kymberly Espinosa MRN# [id number]   YOB: 1950 Age: 68 year old     Date of Procedure: 2018  Primary care provider: Kimmie Hayes  Type of Endoscopy: colonoscopy  Reason for Procedure: rectal bleeding  Type of Anesthesia Anticipated: Moderate Sedation    HPI:    Kymberly is a 68 year old female who will be undergoing the above procedure.      A history and physical has been performed. The patient's medications and allergies have been reviewed. The risks and benefits of the procedure and the sedation options and risks were discussed with the patient.  All questions were answered and informed consent was obtained.      She denies a personal or family history of anesthesia complications or bleeding disorders.     Allergies   Allergen Reactions     Aleve [Gnp Naproxen Sodium]      nausea        Prior to Admission Medications   Prescriptions Last Dose Informant Patient Reported? Taking?   aspirin 162 MG EC tablet Past Week at Unknown time  Yes Yes   Sig: Take 81 mg by mouth daily    metroNIDAZOLE 0.75 % LOTN Past Week at Unknown time  Yes Yes   Sig: Apply to face twice daily   triamterene-hydrochlorothiazide (DYAZIDE) 37.5-25 MG per capsule 2018 at Unknown time  No Yes   Sig: Take 1 capsule by mouth daily      Facility-Administered Medications: None       Patient Active Problem List   Diagnosis     Benign essential hypertension     Hyperlipidemia LDL goal <130     Advanced directives, counseling/discussion        Past Medical History:   Diagnosis Date     Basal cell carcinoma     left leg     Hemorrhoid      HTN (hypertension)      Hyperlipidemia      Rectal bleeding      Rosacea         Past Surgical History:   Procedure Laterality Date     C NONSPECIFIC PROCEDURE      removal of basal cell ca leg      SECTION      x1     COLONOSCOPY         Social History   Substance Use Topics     Smoking status: Former Smoker     Smokeless tobacco: Never Used      "Alcohol use Yes      Comment: Casual       Family History   Problem Relation Age of Onset     CEREBROVASCULAR DISEASE Mother      Colon Cancer No family hx of        REVIEW OF SYSTEMS:     5 point ROS negative except as noted above in HPI, including Gen., Resp., CV, GI &  system review.      PHYSICAL EXAM:   /65  Resp 15  Ht 1.499 m (4' 11\")  Wt 61.7 kg (136 lb)  BMI 27.47 kg/m2 Estimated body mass index is 27.47 kg/(m^2) as calculated from the following:    Height as of this encounter: 1.499 m (4' 11\").    Weight as of this encounter: 61.7 kg (136 lb).   GENERAL APPEARANCE: healthy and alert  MENTAL STATUS: alert  AIRWAY EXAM: Mallampatti Class II (visualization of the soft palate, fauces, and uvula)  RESP: lungs clear to auscultation - no rales, rhonchi or wheezes  CV: regular rates and rhythm      DIAGNOSTICS:    Not indicated      IMPRESSION   ASA Class 2 - Mild systemic disease        PLAN:       Plan for colonoscopy. We discussed the risks, benefits and alternatives and the patient wished to proceed.    The above has been forwarded to the consulting provider.      Signed Electronically by: Tiffany Saucedo MD  April 24, 2018    "

## 2018-04-24 NOTE — IP AVS SNAPSHOT
MRN:[id number]                      After Visit Summary   4/24/2018    Kymberly Espinosa    MRN: [id number]           Thank you!     Thank you for choosing [facility] for your care. Our goal is always to provide you with excellent care. Hearing back from our patients is one way we can continue to improve our services. Please take a few minutes to complete the written survey that you may receive in the mail after you visit. If you would like to speak to someone directly about your visit please contact Patient Relations at [phone]. Thank you!          Patient Information     Date Of Birth          1950        About your hospital stay     You were admitted on:  April 24, 2018 You last received care in the:  [facility]    You were discharged on:  April 24, 2018       Who to Call     For medical emergencies, please call 911.  For non-urgent questions about your medical care, please call your primary care provider or clinic, [phone]  For questions related to your surgery, please call your surgery clinic        Attending Provider     Provider Specialty    Tiffany Saucedo MD Colon and Rectal Surgery       Primary Care Provider Office Phone # Fax #    Kimmie Hayes [phone]      Further instructions from your care team         Understanding Colon and Rectal Polyps     The colon has a smooth lining composed of millions of cells.     The colon (also called the large intestine) is a muscular tube that forms the last part of the digestive tract. It absorbs water and stores food waste. The colon is about 4 to 6 feet long. The rectum is the last 6 inches of the colon. The colon and rectum have a smooth lining composed of millions of cells. Changes in these cells can lead to growths in the colon that can become cancerous and should be removed.     When the Colon Lining Changes  Changes that occur in the cells that line the colon or rectum can  lead to growths called polyps. Over a period of years, polyps can turn cancerous. Removing polyps early may prevent cancer from ever forming.      Polyps  Polyps are fleshy clumps of tissue that form on the lining of the colon or rectum. Small polyps are usually benign (not cancerous). However, over time, cells in a polyp can change and become cancerous. The larger a polyp grows, the more likely this is to happen. Also, certain types of polyps known as adenomatous polyps are considered premalignant. This means that they will almost always become cancerous if they re not removed.          Cancer  Almost all colorectal cancers start when polyp cells begin growing abnormally. As a cancerous tumor grows, it may involve more and more of the colon or rectum. In time, cancer can also grow beyond the colon or rectum and spread to nearby organs or to glands called lymph nodes. The cells can also travel to other parts of the body. This is known as metastasis. The earlier a cancerous tumor is removed, the better the chance of preventing its spread.        2076-[address]97 [address], [place] [address]. All rights reserved. This information is not intended as a substitute for professional medical care. Always follow your healthcare professional's instructions.        Understanding Colon and Rectal Polyps     The colon has a smooth lining composed of millions of cells.     The colon (also called the large intestine) is a muscular tube that forms the last part of the digestive tract. It absorbs water and stores food waste. The colon is about 4 to 6 feet long. The rectum is the last 6 inches of the colon. The colon and rectum have a smooth lining composed of millions of cells. Changes in these cells can lead to growths in the colon that can become cancerous and should be removed.     When the Colon Lining Changes  Changes that occur in the cells that line the colon or rectum can lead to growths called polyps.  Over a period of years, polyps can turn cancerous. Removing polyps early may prevent cancer from ever forming.      Polyps  Polyps are fleshy clumps of tissue that form on the lining of the colon or rectum. Small polyps are usually benign (not cancerous). However, over time, cells in a polyp can change and become cancerous. The larger a polyp grows, the more likely this is to happen. Also, certain types of polyps known as adenomatous polyps are considered premalignant. This means that they will almost always become cancerous if they re not removed.          Cancer  Almost all colorectal cancers start when polyp cells begin growing abnormally. As a cancerous tumor grows, it may involve more and more of the colon or rectum. In time, cancer can also grow beyond the colon or rectum and spread to nearby organs or to glands called lymph nodes. The cells can also travel to other parts of the body. This is known as metastasis. The earlier a cancerous tumor is removed, the better the chance of preventing its spread.        3491[address] [place] [address]. All rights reserved. This information is not intended as a substitute for professional medical care. Always follow your healthcare professional's instructions.      Understanding Diverticulosis and Diverticulitis     Pouches or diverticula usually occur in the lower part of the colon called the sigmoid.      Diverticulitis occurs when the pouches become inflamed.     The colon (large intestine) is the last part of the digestive tract. It absorbs water from stool and changes it from a liquid to a solid. In certain cases, small pouches called diverticula can form in the colon wall. This condition is called diverticulosis. The pouches can become infected. If this happens, it becomes a more serious problem called diverticulitis. These problems can be painful. But they can be managed.   Managing Your Condition  Diet changes or taking  medications are often tried first. These may be enough to bring relief. If the case is bad, surgery may be done. You and your doctor can discuss the plan that is best for you.  If You Have Diverticulosis  Diet changes are often enough to control symptoms. The main changes are adding fiber (roughage) and drinking more water. Fiber absorbs water as it travels through your colon. This helps your stool stay soft and move smoothly. Water helps this process. If needed, you may be told to take over-the-counter stool softeners. To help relieve pain, antispasmodic medications may be prescribed.  If You Have Diverticulitis  Treatment depends on how bad your symptoms are.  For mild symptoms: You may be put on a liquid diet for a short time. You may also be prescribed antibiotics. If these two steps relieve your symptoms, you may then be prescribed a high-fiber diet. If you still have symptoms, your doctor will discuss further treatment options with you.  For severe symptoms: You may need to be admitted to the hospital. There, you can be given IV antibiotics and fluids. Once symptoms are under control, the above treatments may be tried. If these don t control your condition, your doctor may discuss the option of having surgery with you.  [place] to Colon Health  Help keep your colon healthy with a diet that includes plenty of high-fiber fruits, vegetables, and whole grains. Drink plenty of liquids like water and juice. Your doctor may also recommend avoiding seeds and nuts.          6545-8095 [facility], [address] [place] [address], [place] [address]. All rights reserved. This information is not intended as a substitute for professional medical care. Always follow your healthcare professional's instructions.      HIGH FIBER DIET  Fiber is present in all fruits, vegetables, cereals and grains. Fiber passes through the body undigested. A high fiber diet helps food move through the intestinal tract. The added bulk is helpful in  preventing constipation. In people with diverticulosis it serves to clean out the pouches along the colon wall while preventing new ones from forming. A high fiber diet also reduces the risk of colon cancer, decreases blood cholesterol and prevents high blood sugar in people with diabetes.    The foods listed below are high in fiber and should be included in your diet. If you are not used to high fiber foods, start with 1 or 2 foods from this list. Every 3-4 days add a new one to your diet until you are eating 4 high fiber foods per day. This should give you 20-35 Gm of fiber/day. It is also important to drink a lot of water when you are on this diet (6-8 glasses a day). Water causes the fiber to swell and increases the benefit.    FOODS HIGH IN DIETARY FIBER:  BREADS: Made with 100% whole wheat flour; francine, wheat or rye crackers; tortillas, bran muffins  CEREALS: Whole grain cereal with bran (Chex, Raisin Bran, Corn Bran), oatmeal, rolled oats, granola, wheat flakes, brown rice  NUTS: Any nuts  FRUITS: All fresh fruits along with edible skins, (bananas, citrus fruit, mangoes, pears, prunes, raisins, apples, pineapple, apricot, melon, jams and marmalades), fruit juices (especially prune juice)  VEGETABLES: All types, preferably raw or lightly cooked: especially, celery, eggplant, potatoes, spinach, broccoli, brussel sprouts, winter squash, carrots, cauliflower, soybeans, lentils, fresh and dried beans of all kinds  OTHER: Popcorn, any spices      1741[address] [place] [address]. All rights reserved. This information is not intended as a substitute for professional medical care. Always follow your healthcare professional's instructions.      Pending Results     Date and Time Order Name Status Description    4/24/2018 1107 Surgical pathology exam In process             Admission Information     Date & Time Provider Department Dept. Phone    4/24/2018 Tiffany Saucedo MD  "[facility] [phone]      Your Vitals Were     Blood Pressure Respirations Height Weight Pulse Oximetry BMI (Body Mass Index)    123/57 16 1.499 m (4' 11\") 61.7 kg (136 lb) 93% 27.47 kg/m2      MyChart Information     [employer] gives you secure access to your electronic health record. If you see a primary care provider, you can also send messages to your care team and make appointments. If you have questions, please call your primary care clinic.  If you do not have a primary care provider, please call [phone] and they will assist you.        Care EveryWhere ID     This is your Care EveryWhere ID. This could be used by other organizations to access your [facility] medical records  [id number]        Equal Access to Services     NICHOLAS SHAW : Jaison Mosley, rudy cai, kulwinder thornton, mahesh camarillo. So [facility] [phone].    ATENCIÓN: Si habla español, tiene a pillai disposición servicios gratuitos de asistencia lingüística. Llame al [phone].    We comply with applicable federal civil rights laws and [place] laws. We do not discriminate on the basis of race, color, national origin, age, disability, sex, sexual orientation, or gender identity.               Review of your medicines      CONTINUE these medicines which have NOT CHANGED        Dose / Directions    aspirin 162 MG EC tablet        Dose:  81 mg   Take 81 mg by mouth daily   Refills:  0       metroNIDAZOLE 0.75 % Lotn        Apply to face twice daily   Refills:  0       triamterene-hydrochlorothiazide 37.5-25 MG per capsule   Commonly known as:  DYAZIDE   Used for:  Benign essential hypertension        Dose:  1 capsule   Take 1 capsule by mouth daily   Quantity:  90 capsule   Refills:  3                Protect others around you: Learn how to safely use, store and throw away your medicines at www.disposemymeds.org.             Medication List: This is a list of all your " medications and when to take them. Check marks below indicate your daily home schedule. Keep this list as a reference.      Medications           Morning Afternoon Evening Bedtime As Needed    aspirin 162 MG EC tablet   Take 81 mg by mouth daily                                metroNIDAZOLE 0.75 % Lotn   Apply to face twice daily                                triamterene-hydrochlorothiazide 37.5-25 MG per capsule   Commonly known as:  DYAZIDE   Take 1 capsule by mouth daily

## 2018-04-25 LAB — COPATH REPORT: NORMAL

## 2018-11-14 ENCOUNTER — HOSPITAL ENCOUNTER (OUTPATIENT)
Dept: MAMMOGRAPHY | Facility: CLINIC | Age: 68
Discharge: HOME | End: 2018-11-14
Attending: INTERNAL MEDICINE | Admitting: INTERNAL MEDICINE
Payer: COMMERCIAL

## 2018-11-14 DIAGNOSIS — Z12.31 VISIT FOR SCREENING MAMMOGRAM: ICD-10-CM

## 2018-11-14 PROCEDURE — 77063 BREAST TOMOSYNTHESIS BI: CPT

## 2018-12-04 ENCOUNTER — OFFICE VISIT (OUTPATIENT)
Dept: PODIATRY | Facility: CLINIC | Age: 68
End: 2018-12-04
Payer: COMMERCIAL

## 2018-12-04 VITALS
HEIGHT: 59 IN | WEIGHT: 136 LBS | DIASTOLIC BLOOD PRESSURE: 68 MMHG | BODY MASS INDEX: 27.42 KG/M2 | SYSTOLIC BLOOD PRESSURE: 116 MMHG

## 2018-12-04 DIAGNOSIS — G57.61 MORTON'S NEUROMA OF THIRD INTERSPACE OF RIGHT FOOT: Primary | ICD-10-CM

## 2018-12-04 PROCEDURE — 64455 NJX AA&/STRD PLTR COM DG NRV: CPT | Performed by: PODIATRIST

## 2018-12-04 PROCEDURE — 99213 OFFICE O/P EST LOW 20 MIN: CPT | Mod: 25 | Performed by: PODIATRIST

## 2018-12-04 RX ORDER — TRIAMCINOLONE ACETONIDE 40 MG/ML
40 INJECTION, SUSPENSION INTRA-ARTICULAR; INTRAMUSCULAR ONCE
Qty: 1 ML | Refills: 0 | OUTPATIENT
Start: 2018-12-04 | End: 2018-12-04

## 2018-12-04 NOTE — MR AVS SNAPSHOT
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      Your next 10 appointments already scheduled     Dec 20, 2018  7:40 AM CST   PHYSICAL with Kimmie Hayes MD   [facility] ([facility]    [address]  [facility] [address] [phone]              Who to contact     If you have questions or need follow up information about today's clinic visit or your schedule please contact [facility] directly at [phone].  Normal or non-critical lab and imaging results will be communicated to you by MyChart, letter or phone within 4 business days after the clinic has received the results. If you do not hear from us within 7 days, please contact the clinic through [facility] or phone. If you have a critical or abnormal lab result, we will notify you by phone as soon as possible.  Submit refill requests through [employer] or call your pharmacy and they will forward the refill request to us. Please allow 3 business days for your refill to be completed.          Additional Information About Your Visit        MyChart Information     [employer] gives you secure access to your electronic health record. If you see a primary care provider, you can also send messages to your care team and make appointments. If you have questions, please call your primary care clinic.  If you do not have a primary care provider, please call [phone] and they will assist you.        Care EveryWhere ID     This is your Care EveryWhere ID. This could be used  "by other organizations to access your [facility] medical records  [id number]        Your Vitals Were     Height BMI (Body Mass Index)                4' 11\" (1.499 m) 27.47 kg/m2           Blood Pressure from Last 3 Encounters:   12/04/18 116/68   04/24/18 134/67   12/18/17 138/60    Weight from Last 3 Encounters:   12/04/18 136 lb (61.7 kg)   04/24/18 136 lb (61.7 kg)   01/02/18 136 lb (61.7 kg)              We Performed the Following     N BLOCK INJ,  PLANTAR DIGIT          Today's Medication Changes          These changes are accurate as of 12/4/18 12:58 PM.  If you have any questions, ask your nurse or doctor.               Start taking these medicines.        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  Ask your nurse if you have questions.     You don't need a prescription for these medications     triamcinolone 40 MG/ML injection                Primary Care Provider Office Phone # Fax #    Kimmie Hayes [phone]       [address] [address]        Equal Access to Services     [facility]: Hadii sandro yañezo Soavery, waaxda luqadaha, qaybta kaalmada norberto, waxradhika yesy fuentes [facility] camarillo. So [facility] [phone].    ATENCIÓN: Si habla español, tiene a pillai disposición servicios gratuitos de asistencia lingüística. Llame al [phone].    We comply with applicable federal civil rights laws and [place] laws. We do not discriminate on the basis of race, color, national origin, age, disability, sex, sexual orientation, or gender identity.            Thank you!     Thank you for " choosing [facility]  for your care. Our goal is always to provide you with excellent care. Hearing back from our patients is one way we can continue to improve our services. Please take a few minutes to complete the written survey that you may receive in the mail after your visit with us. Thank you!             Your Updated Medication List - Protect others around you: Learn how to safely use, store and throw away your medicines at www.disposemymeds.org.          This list is accurate as of 12/4/18 12:58 PM.  Always use your most recent med list.                   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

## 2018-12-04 NOTE — PROGRESS NOTES
"Foot & Ankle Surgery   December 4, 2018    S:  Pt is seen today for evaluation of right foot pain.  We saw her Jan 2018 for nichols's neuroma right foot.  Shoes, inserts, RICE/NSAID prn.  She states the inserts and remaining plan have worked well but the pain can be severe.  Stockings/socks can be quite tender.    Vitals:    12/04/18 1136   BP: 116/68   Weight: 136 lb (61.7 kg)   Height: 4' 11\" (1.499 m)   '      ROS - Pos for CC.  Patient denies current nausea, vomiting, chills, fevers, belly pain, calf pain, chest pain or SOB.  Complete remainder of ROS it otherwise neg.      PE:  Gen:   No apparent distress  Eye:    Visual scanning without deficit  Ear:    Response to auditory stimuli wnl  Lung:    Non-labored breathing on RA noted  Abd:    NTND per patient report  Lymph:    Neg for pitting/non-pitting edema BLE  Vasc:    Pulses palpable, CFT minimally delayed  Neuro:    Light touch sensation intact to all sensory nerve distributions without paresthesias  Derm:    Neg for nodules, lesions or ulcerations  MSK:    Tender 3rd interspace with palpable clikc.  No met/MPJ pain noted.    Calf:    Neg for redness, swelling or tenderness      Assessment:  68 year old female with nichols's neuroma right lower extremity       Plan:  Discussed etiologies, anatomy and options  1.  Nichols's neuroma right lower extremity   -continue comfortable accommodative shoe gear  -continue OTC inserts  -SARAH/ENSAID vs tylenol prn based on pain  -diagnostic/therapeutic steroid injection; see procedure note.  Monitor %, location, duration of improvement    After obtaining written consent, the skin was prepped with alcohol.  The needle was advance to the underlying right 3rd common digital plantar nerve, aspiration was done with position change.  1 1/2cc mixture of 2:1 kenalog 40:0.25% marcaine plain was injected.  The patient tolerated the procedure without complication.  Risks that were discussed included possible joint/soft tissue damage, " neuritis/numbness, infection, pigment change, steroid flare.       Follow up:  Prn based on injection results or sooner with acute issues      Body mass index is 27.47 kg/(m^2).  Weight management plan: Patient was referred to their PCP to discuss a diet and exercise plan.         Sagar Martinez DPM FACFAS FACFAOM  Podiatric Foot & Ankle Surgeon  [facility]  [phone]

## 2018-12-13 DIAGNOSIS — I10 BENIGN ESSENTIAL HYPERTENSION: ICD-10-CM

## 2018-12-13 RX ORDER — TRIAMTERENE AND HYDROCHLOROTHIAZIDE 37.5; 25 MG/1; MG/1
1 CAPSULE ORAL DAILY
Qty: 30 CAPSULE | Refills: 0 | Status: SHIPPED | OUTPATIENT
Start: 2018-12-13 | End: 2018-12-20

## 2018-12-13 NOTE — TELEPHONE ENCOUNTER
"Requested Prescriptions   Pending Prescriptions Disp Refills     triamterene-HCTZ (DYAZIDE) 37.5-25 MG capsule [Pharmacy Med Name: Triamterene-HCTZ Oral Capsule 37.5-25 MG] 90 capsule 2    Last Written Prescription Date:  12/18/2017  Last Fill Quantity: 90,  # refills: 3   Last office visit: 12/18/2017 with prescribing provider:     Future Office Visit:   Next 5 appointments (look out 90 days)    Dec 20, 2018  7:40 AM CST  PHYSICAL with Kimmie Hayes MD  [facility] ([facility]) [address]  [facility] [address]  [phone]        Sig: Take 1 capsule by mouth daily    Diuretics (Including Combos) Protocol Passed - 12/13/2018  7:04 AM       Passed - Blood pressure under 140/90 in past 12 months    BP Readings from Last 3 Encounters:   12/04/18 116/68   04/24/18 134/67   12/18/17 138/60                Passed - Recent (12 mo) or future (30 days) visit within the authorizing provider's specialty    Patient had office visit in the last 12 months or has a visit in the next 30 days with authorizing provider or within the authorizing provider's specialty.  See \"Patient Info\" tab in inbasket, or \"Choose Columns\" in Meds & Orders section of the refill encounter.             Passed - Patient is age 18 or older       Passed - No active pregancy on record       Passed - Normal serum creatinine on file in past 12 months    Recent Labs   Lab Test 12/18/17 0919   CR 0.70             Passed - Normal serum potassium on file in past 12 months    Recent Labs   Lab Test 12/18/17 0919   POTASSIUM 3.5                   Passed - Normal serum sodium on file in past 12 months    Recent Labs   Lab Test 12/18/17  0919                Passed - No positive pregnancy test in past 12 months        "

## 2018-12-20 ENCOUNTER — OFFICE VISIT (OUTPATIENT)
Dept: INTERNAL MEDICINE | Facility: CLINIC | Age: 68
End: 2018-12-20
Payer: COMMERCIAL

## 2018-12-20 VITALS
HEIGHT: 58 IN | BODY MASS INDEX: 28.57 KG/M2 | HEART RATE: 66 BPM | RESPIRATION RATE: 16 BRPM | WEIGHT: 136.1 LBS | OXYGEN SATURATION: 95 % | TEMPERATURE: 97.6 F | DIASTOLIC BLOOD PRESSURE: 68 MMHG | SYSTOLIC BLOOD PRESSURE: 118 MMHG

## 2018-12-20 DIAGNOSIS — Z00.00 ENCOUNTER FOR ROUTINE ADULT HEALTH EXAMINATION WITHOUT ABNORMAL FINDINGS: Primary | ICD-10-CM

## 2018-12-20 DIAGNOSIS — E78.5 HYPERLIPIDEMIA LDL GOAL <130: ICD-10-CM

## 2018-12-20 DIAGNOSIS — I10 BENIGN ESSENTIAL HYPERTENSION: ICD-10-CM

## 2018-12-20 LAB
ANION GAP SERPL CALCULATED.3IONS-SCNC: 6 MMOL/L (ref 3–14)
BUN SERPL-MCNC: 24 MG/DL (ref 7–30)
CALCIUM SERPL-MCNC: 9.6 MG/DL (ref 8.5–10.1)
CHLORIDE SERPL-SCNC: 104 MMOL/L (ref 94–109)
CHOLEST SERPL-MCNC: 207 MG/DL
CO2 SERPL-SCNC: 29 MMOL/L (ref 20–32)
CREAT SERPL-MCNC: 0.81 MG/DL (ref 0.52–1.04)
GFR SERPL CREATININE-BSD FRML MDRD: 75 ML/MIN/{1.73_M2}
GLUCOSE SERPL-MCNC: 93 MG/DL (ref 70–99)
HCV AB SERPL QL IA: NONREACTIVE
HDLC SERPL-MCNC: 48 MG/DL
LDLC SERPL CALC-MCNC: 138 MG/DL
NONHDLC SERPL-MCNC: 159 MG/DL
POTASSIUM SERPL-SCNC: 3.7 MMOL/L (ref 3.4–5.3)
SODIUM SERPL-SCNC: 139 MMOL/L (ref 133–144)
TRIGL SERPL-MCNC: 106 MG/DL

## 2018-12-20 PROCEDURE — G0439 PPPS, SUBSEQ VISIT: HCPCS | Performed by: INTERNAL MEDICINE

## 2018-12-20 PROCEDURE — 82043 UR ALBUMIN QUANTITATIVE: CPT | Performed by: INTERNAL MEDICINE

## 2018-12-20 PROCEDURE — 80048 BASIC METABOLIC PNL TOTAL CA: CPT | Performed by: INTERNAL MEDICINE

## 2018-12-20 PROCEDURE — 80061 LIPID PANEL: CPT | Performed by: INTERNAL MEDICINE

## 2018-12-20 PROCEDURE — 36415 COLL VENOUS BLD VENIPUNCTURE: CPT | Performed by: INTERNAL MEDICINE

## 2018-12-20 PROCEDURE — 86803 HEPATITIS C AB TEST: CPT | Performed by: INTERNAL MEDICINE

## 2018-12-20 RX ORDER — TRIAMTERENE AND HYDROCHLOROTHIAZIDE 37.5; 25 MG/1; MG/1
1 CAPSULE ORAL DAILY
Qty: 90 CAPSULE | Refills: 3 | Status: SHIPPED | OUTPATIENT
Start: 2018-12-20 | End: 2019-12-27

## 2018-12-20 RX ORDER — IVERMECTIN 10 MG/G
CREAM TOPICAL
Refills: 11 | COMMUNITY
Start: 2018-10-21 | End: 2023-07-13

## 2018-12-20 ASSESSMENT — ENCOUNTER SYMPTOMS
DIZZINESS: 0
MYALGIAS: 0
WEAKNESS: 0
DIARRHEA: 0
CONSTIPATION: 0
NERVOUS/ANXIOUS: 0
FREQUENCY: 0
EYE PAIN: 0
SORE THROAT: 0
FEVER: 0
HEMATURIA: 0
JOINT SWELLING: 0
PARESTHESIAS: 0
HEMATOCHEZIA: 0
HEADACHES: 0
ARTHRALGIAS: 0
BREAST MASS: 0
SHORTNESS OF BREATH: 0
HEARTBURN: 0
COUGH: 0
PALPITATIONS: 0
CHILLS: 0
NAUSEA: 0
ABDOMINAL PAIN: 0
DYSURIA: 0

## 2018-12-20 ASSESSMENT — ACTIVITIES OF DAILY LIVING (ADL): CURRENT_FUNCTION: NO ASSISTANCE NEEDED

## 2018-12-20 ASSESSMENT — MIFFLIN-ST. JEOR: SCORE: 1046.07

## 2018-12-20 NOTE — NURSING NOTE
"/68   Pulse 66   Temp 97.6  F (36.4  C) (Oral)   Resp 16   Ht 1.48 m (4' 10.25\")   Wt 61.7 kg (136 lb 1.6 oz)   SpO2 95%   BMI 28.20 kg/m      "

## 2018-12-20 NOTE — PROGRESS NOTES
"SUBJECTIVE:   Kymberly Espinosa is a 68 year old female who presents for Preventive Visit.  Are you in the first 12 months of your Medicare coverage?  No    Annual Wellness Visit     In general, how would you rate your overall health?  Good    Frequency of exercise:  2-3 days/week    Do you usually eat at least 4 servings of fruit and vegetables a day, include whole grains    & fiber and avoid regularly eating high fat or \"junk\" foods?  Yes    Taking medications regularly:  Yes    Medication side effects:  None    Ability to successfully perform activities of daily living:  No assistance needed    Home Safety:  No safety concerns identified    Hearing Impairment:  Need to ask people to speak up or repeat themselves and no hearing concerns    In the past 6 months, have you been bothered by leaking of urine?  No    In general, how would you rate your overall mental or emotional health?  Good    PHQ-2 Total Score: 0    Additional concerns today:  No    Problems:   1. HTN: She does not check regularly but recent level podiatry, good.  No concerns about her medication.  No chest pains, palpitations, dyspnea on exertion  2.  Hyperlipidemia: She feels her diet is fairly well controlled.      Do you feel safe in your environment? Yes    Do you have a Health Care Directive? Yes: Patient states has Advance Directive and will bring in a copy to clinic.      Fall risk  Fallen 2 or more times in the past year?: No  Any fall with injury in the past year?: No    Cognitive Screening   1) Repeat 3 items (Leader, Season, Table)    2) Clock draw: NORMAL  3) 3 item recall: Recalls 3 objects  Results: 3 items recalled: COGNITIVE IMPAIRMENT LESS LIKELY    Mini-CogTM Copyright ALINE Bates. Licensed by the author for use in [facility]; reprinted with permission (carol@.[email or web address]). All rights reserved.      Do you have sleep apnea, excessive snoring or daytime drowsiness?: no    Reviewed and updated as needed this visit by clinical " staff         Reviewed and updated as needed this visit by Provider        Social History     Tobacco Use     Smoking status: Former Smoker     Smokeless tobacco: Never Used   Substance Use Topics     Alcohol use: Yes     Comment: Casual       Alcohol Use 12/20/2018   If you drink alcohol do you typically have greater than 3 drinks per day OR greater than 7 drinks per week? No   No flowsheet data found.            Current providers sharing in care for this patient include:   Patient Care Team:  Kimmie Hayes MD as PCP - General (Internal Medicine)    The following health maintenance items are reviewed in Epic and correct as of today:  Health Maintenance   Topic Date Due     HEPATITIS C SCREENING  01/12/1969     DTAP/TDAP/TD IMMUNIZATION (1 - Tdap) 01/12/1976     ZOSTER IMMUNIZATION (2 of 3) 02/26/2009     INFLUENZA VACCINE (1) 09/01/2018     LIPID MONITORING Q1 YEAR  12/18/2018     FALL RISK ASSESSMENT  12/18/2018     PHQ-2 Q1 YR  12/18/2018     MAMMO Q1 YR  11/14/2019     ADVANCE DIRECTIVE PLANNING Q5 YRS  12/22/2020     COLONOSCOPY Q10 YR  04/24/2028     DEXA SCAN SCREENING (SYSTEM ASSIGNED)  Completed     IPV IMMUNIZATION  Aged Out     MENINGITIS IMMUNIZATION  Aged Out       Patient Active Problem List   Diagnosis     Benign essential hypertension     Hyperlipidemia LDL goal <130     Advanced directives, counseling/discussion     Current Outpatient Medications   Medication Sig Dispense Refill     aspirin 162 MG EC tablet Take 81 mg by mouth daily        SOOLANTRA 1 % cream   11     triamterene-HCTZ (DYAZIDE) 37.5-25 MG capsule Take 1 capsule by mouth daily 30 capsule 0          Review of Systems   Constitutional: Negative for chills and fever.   HENT: Negative for congestion, ear pain, hearing loss and sore throat.    Eyes: Positive for visual disturbance. Negative for pain.   Respiratory: Negative for cough and shortness of breath.    Cardiovascular: Negative for chest pain, palpitations and peripheral edema.  "  Gastrointestinal: Negative for abdominal pain, constipation, diarrhea, heartburn, hematochezia and nausea.   Breasts:  Negative for tenderness, breast mass and discharge.   Genitourinary: Negative for dysuria, frequency, genital sores, hematuria, pelvic pain, urgency, vaginal bleeding and vaginal discharge.   Musculoskeletal: Negative for arthralgias, joint swelling and myalgias.   Skin: Negative for rash.   Neurological: Negative for dizziness, weakness, headaches and paresthesias.   Psychiatric/Behavioral: Negative for mood changes. The patient is not nervous/anxious.      Issues driving at night, early cataracts.       OBJECTIVE:   There were no vitals taken for this visit. Estimated body mass index is 27.47 kg/m  as calculated from the following:    Height as of 12/4/18: 1.499 m (4' 11\").    Weight as of 12/4/18: 61.7 kg (136 lb).  Physical Exam    Objective:  Patient alert, in no acute distress  /68   Pulse 66   Temp 97.6  F (36.4  C) (Oral)   Resp 16   Ht 1.48 m (4' 10.25\")   Wt 61.7 kg (136 lb 1.6 oz)   SpO2 95%   BMI 28.20 kg/m      HEENT: extraocular movements are intact, pupils equal and reactive to light and accommodation, TMs clear, oropharynx clear  NECK: Neck supple. No adenopathy. Thyroid symmetric, normal size,, Carotids without bruits.  PULMONARY: clear to auscultation  CARDIAC: regular rate and rhythm and no murmurs, clicks, or gallops  PULSES: 2/2 throughout  BACK: no spinal or CVAT  ABDOMINAL: Soft, nontender.  Normal bowel sounds.  No hepatosplenomegaly or abnormal masses  BREAST: No breast masses or tenderness, No axillary masses or tenderness and No galactorrhea  PELVIC: declined  REFLEXES: 2+ throughout  SKIN: unremarkable    Lab Results   Component Value Date    HDL 36 12/18/2017     12/18/2017    CHOL 218 12/18/2017    TRIG 215 12/18/2017             ASSESSMENT / PLAN:   1. Encounter for routine adult health examination without abnormal findings    - Hepatitis C Screen " "Reflex to HCV RNA Quant and Genotype    2. Benign essential hypertension  Well-controlled, continue medication, labs  - triamterene-HCTZ (DYAZIDE) 37.5-25 MG capsule; Take 1 capsule by mouth daily  Dispense: 90 capsule; Refill: 3  - Basic metabolic panel  - Albumin Random Urine Quantitative with Creat Ratio    3. Hyperlipidemia LDL goal <130  Recheck levels, consider more aggressive diet  - Lipid panel reflex to direct LDL Fasting    End of Life Planning:  Patient currently has an advanced directive: Yes.  Practitioner is supportive of decision.    COUNSELING:  Reviewed preventive health counseling, as reflected in patient instructions    BP Readings from Last 1 Encounters:   12/04/18 116/68     Estimated body mass index is 27.47 kg/m  as calculated from the following:    Height as of 12/4/18: 1.499 m (4' 11\").    Weight as of 12/4/18: 61.7 kg (136 lb).           reports that she has quit smoking. she has never used smokeless tobacco.      Appropriate preventive services were discussed with this patient, including applicable screening as appropriate for cardiovascular disease, diabetes, osteopenia/osteoporosis, and glaucoma.  As appropriate for age/gender, discussed screening for colorectal cancer, prostate cancer, breast cancer, and cervical cancer. Checklist reviewing preventive services available has been given to the patient.    Reviewed patients plan of care and provided an AVS. The Basic Care Plan (routine screening as documented in Health Maintenance) for [place]ola meets the Care Plan requirement. This Care Plan has been established and reviewed with the Patient.    Counseling Resources:  ATP IV Guidelines  Pooled Cohorts Equation Calculator  Breast Cancer Risk Calculator  FRAX Risk Assessment  ICSI Preventive Guidelines  Dietary Guidelines for Americans, 2010  USDA's MyPlate  ASA Prophylaxis  Lung CA Screening    Kimmie Hayes MD  [facility]  "

## 2018-12-20 NOTE — PATIENT INSTRUCTIONS
Shingrix is the new shingles vaccine. Call insurance to find out if covered, whether covered at a pharmacy or doctor's office and the copay.       PREVENTIVE HEALTH RECOMMENDATIONS:     Vaccines: Get a flu shot each year. Get a tetanus shot every 10 years.     Exercise for at least 150 minutes a week (an average of 30 minutes a day, 5 days of the week). This will help you control your weight and prevent disease.    Limit alcohol to one drink per day.    No smoking.     Wear sunscreen to prevent skin cancer.     See your dentist twice a year for an exam and cleaning.    Try to get Calcium 1200 mg total per day. It is best to not take it all at once. Try to get Vitamin D at least 9511-8888 units (25-50 mcg) per day.    BMI or Body Mass Index is a way of indicating weight and health risk for cardiovascular diseases, high blood pressure, diabetes.   Definitions:    Underweight is less than 18.5 and will be associated with health risk.   Normal BMI is 18.5 to 25   Overweight is 25-29   Obesity is 30 or greater   Morbid Obesity is 40 or greater or 35 or greater with diabetes, prediabetes or abnormal blood sugar, high blood pressure or elevated cholesterol  Obesity and Morbid Obesity are associated with higher health risks. Lowering calories, exercising more may lower your BMI and even small decreases can have positive impact on lowering health risks.   Your Body mass index is 28.2 kg/m ..,

## 2018-12-21 LAB
CREAT UR-MCNC: 150 MG/DL
MICROALBUMIN UR-MCNC: 11 MG/L
MICROALBUMIN/CREAT UR: 7.2 MG/G CR (ref 0–25)

## 2019-03-25 ENCOUNTER — TRANSFERRED RECORDS (OUTPATIENT)
Dept: HEALTH INFORMATION MANAGEMENT | Facility: CLINIC | Age: 69
End: 2019-03-25

## 2019-06-04 ENCOUNTER — APPOINTMENT (OUTPATIENT)
Age: 69
Setting detail: DERMATOLOGY
End: 2019-06-05

## 2019-06-04 DIAGNOSIS — Z41.9 ENCOUNTER FOR PROCEDURE FOR PURPOSES OTHER THAN REMEDYING HEALTH STATE, UNSPECIFIED: ICD-10-CM

## 2019-06-04 PROCEDURE — OTHER BOTOX: OTHER

## 2019-06-04 NOTE — PROCEDURE: BOTOX
Additional Area 4 Location: [facility] Additional Area 4 Location: [facility]

## 2019-06-04 NOTE — PROCEDURE: BOTOX
Consent: Assessment :\\n“Robi Aesthetics Scales”\\n-Wrinkles= mild, Lines present at rest= mild, Folds= moderate, Volume Loss & Skin Laxity= moderate.  \\n-Glogau Wrinkle Scale:  II\\n-Treatment areas reviewed with the patient while holding a hand-held mirror.  \\n-The patient has realistic expectations. \\n-Written consent obtained. Patient verbalized understanding. Questions answered. See written consent on file. \\n-The skin was prepped with alcohol, skin markings made and injections provided. \\n-The treatment was administered to the area(s) above.\\n-The patient tolerated the procedure well w/o incident.  \\n-Additional product (Botox) may be necessary for optimal correction following treatment and/or maintenance.\\n\\n-Pt has naturally occurring heaviness in L brow compared to R.\\n-Patient instructed to not lie down for 4-6 hours.  Patient instructed not to massage or manipulate treatment areas (avoid facial treatments or Clarisonic Brush) and limit physical activity for 24 hours.\\n-Recommended:  Voluma temples, cheeks, chin and lateral jawline for structure.\\n-Instructed to avoid: NSAIDs, Vitamin E, Fish oil, Ginko Balboa and alcohol 5-7 days before filler treatment

## 2019-06-19 ENCOUNTER — APPOINTMENT (OUTPATIENT)
Age: 69
Setting detail: DERMATOLOGY
End: 2019-06-19

## 2019-06-19 DIAGNOSIS — Z41.9 ENCOUNTER FOR PROCEDURE FOR PURPOSES OTHER THAN REMEDYING HEALTH STATE, UNSPECIFIED: ICD-10-CM

## 2019-06-19 PROCEDURE — OTHER JUVEDERM VOLBELLA INJECTION: OTHER

## 2019-06-19 PROCEDURE — OTHER JUVEDERM ULTRA PLUS XC INJECTION: OTHER

## 2019-06-19 NOTE — PROCEDURE: JUVEDERM VOLBELLA INJECTION
Consent: -Assessment \"Robi Aesthetics Scales\":\\nWrinkles: none-mild, Lines present at rest: none to mild, Folds: mild, Volume loss/skin laxity: mild. -Treatment areas reviewed with patient holding a hand held mirror. The patient has realistic expectations.  \\n-Written consent obtained. Topical anesthesia was achieved with 23% lidocaine, 7% tetracaine. Questions answered. Patient verbalized understanding of the content.  See written informed consent on file. \\n-Topical anesthesia(lot#[id number], exp 9/12/19) was removed. \\n-Listerine mouth rinse provided.\\n-The skin was prepped with with alcohol and chlorhexadine prep. \\n-The filler was administered to the treatment areas noted above. \\n-Aseptic technique was maintained throughout procedure\\n-The patient tolerated the procedure well w/o incident. \\n-Vaseline applied to lips\\n-Additional product (hyaluronic ) may be necessary for optimal correction following treatment and/or maintenance. Consent: -Assessment \"Robi Aesthetics Scales\":\\nWrinkles: none-mild, Lines present at rest: none to mild, Folds: mild, Volume loss/skin laxity: mild. -Treatment areas reviewed with patient holding a hand held mirror. The patient has realistic expectations.  \\n-Written consent obtained. Topical anesthesia was achieved with 23% lidocaine, 7% tetracaine. Questions answered. Patient verbalized understanding of the content.  See written informed consent on file. \\n-Topical anesthesia(lot#[id number]@4, exp 9/12/19) was removed. \\n-Listerine mouth rinse provided.\\n-The skin was prepped with with alcohol and chlorhexadine prep. \\n-The filler was administered to the treatment areas noted above. \\n-Aseptic technique was maintained throughout procedure\\n-The patient tolerated the procedure well w/o incident. \\n-Vaseline applied to lips\\n-Additional product (hyaluronic ) may be necessary for optimal correction following treatment and/or maintenance.

## 2019-06-19 NOTE — PROCEDURE: JUVEDERM VOLBELLA INJECTION
Price (Use Numbers Only, No Special Characters Or $): 712 Price (Use Numbers Only, No Special Characters Or $): 104

## 2019-06-19 NOTE — PROCEDURE: JUVEDERM VOLBELLA INJECTION
Post-Care Instructions: -Ice provided post treatment for 2 to 5 minutes and or to take home. Patient instructed to apply ice 20 minutes on, 20 minutes off while awake for one to 1 to 2 days to reduce swelling. Avoid massage in the area. An over-the-counter antihistamine may be beneficial and was recommended to help with swelling. Patient instructed to notify clinic if any bruising worsens, travels or becomes painful.

## 2019-06-19 NOTE — PROCEDURE: JUVEDERM ULTRA PLUS XC INJECTION
Procedural Text: Topical Anesthesia lot # [id number], exp 06/2019 applied for 40 minutes Procedural Text: Topical Anesthesia lot # [id number], exp 06/2019 applied for 40 minutes

## 2019-06-19 NOTE — PROCEDURE: JUVEDERM ULTRA PLUS XC INJECTION
Consent: -Assessment \"Robi Aesthetics Scales\":\\nWrinkles: none-mild, Lines present at rest: none to mild, Folds: mild, Volume loss/skin laxity: mild.\\n-Treatment areas reviewed with patient holding a hand held mirror. The patient has realistic expectations.  \\n-Written consent obtained. Topical anesthesia was achieved with 23% lidocaine, 7% tetracaine. Questions answered. Patient verbalized understanding of the content.  See written informed consent on file. \\n-Topical anesthesia(lot#[id number]@4, exp 9/12/19) was removed. \\n-Listerine mouth rinse provided.\\n-The skin was prepped with with alcohol and chlorhexadine prep. \\n-The filler was administered to the treatment areas noted above. \\n-The patient tolerated the procedure well w/o incident. \\n-Additional product (hyaluronic ) may be necessary for optimal correction following treatment and/or maintenance. \\n-Ice provided post treatment for 2 to 5 minutes and or to take home. Patient instructed to apply ice 20 minutes on, 20 minutes off while awake for one to 1 to 2 days to reduce swelling. Avoid massage in the area. An over-the-counter antihistamine may be beneficial for the first 72 hours and was recommended to help with swelling.  Patient instructed to notify clinic if any bruising worsens, travels or becomes painful.\\n-Pt instructed to take Valtrex as prescribed post treatment. Consent: -Assessment \"Robi Aesthetics Scales\":\\nWrinkles: none-mild, Lines present at rest: none to mild, Folds: mild, Volume loss/skin laxity: mild.\\n-Treatment areas reviewed with patient holding a hand held mirror. The patient has realistic expectations.  \\n-Written consent obtained. Topical anesthesia was achieved with 23% lidocaine, 7% tetracaine. Questions answered. Patient verbalized understanding of the content.  See written informed consent on file. \\n-Topical anesthesia(lot#[id number]@4, exp 9/12/19) was removed. \\n-Listerine mouth rinse provided.\\n-The skin was prepped with with alcohol and chlorhexadine prep. \\n-The filler was administered to the treatment areas noted above. \\n-The patient tolerated the procedure well w/o incident. \\n-Additional product (hyaluronic ) may be necessary for optimal correction following treatment and/or maintenance. \\n-Ice provided post treatment for 2 to 5 minutes and or to take home. Patient instructed to apply ice 20 minutes on, 20 minutes off while awake for one to 1 to 2 days to reduce swelling. Avoid massage in the area. An over-the-counter antihistamine may be beneficial for the first 72 hours and was recommended to help with swelling.  Patient instructed to notify clinic if any bruising worsens, travels or becomes painful.\\n-Pt instructed to take Valtrex as prescribed post treatment.

## 2019-06-19 NOTE — PROCEDURE: JUVEDERM ULTRA PLUS XC INJECTION
Price (Use Numbers Only, No Special Characters Or $): 214 Price (Use Numbers Only, No Special Characters Or $): 311

## 2019-07-10 ENCOUNTER — APPOINTMENT (OUTPATIENT)
Age: 69
Setting detail: DERMATOLOGY
End: 2019-07-10

## 2019-07-10 DIAGNOSIS — Z41.9 ENCOUNTER FOR PROCEDURE FOR PURPOSES OTHER THAN REMEDYING HEALTH STATE, UNSPECIFIED: ICD-10-CM

## 2019-07-10 PROCEDURE — OTHER JUVEDERM VOLBELLA INJECTION: OTHER

## 2019-07-10 NOTE — PROCEDURE: JUVEDERM VOLBELLA INJECTION
Consent: -Assessment \"Robi Aesthetics Scales\":\\nWrinkles: none-mild, Lines present at rest: none to mild, Folds: mild, Volume loss/skin laxity: mild. -Treatment areas reviewed with patient holding a hand held mirror. The patient has realistic expectations.  \\n-Written consent obtained. Topical anesthesia was achieved with 23% lidocaine, 7% tetracaine. Questions answered. Patient verbalized understanding of the content.  See written informed consent on file. \\n-Topical anesthesia(lot#[id number]@4, exp 9/12/19) was removed. \\n-Listerine mouth rinse provided.\\n-The skin was prepped with with alcohol and chlorhexadine prep. \\n-The filler was administered to the treatment areas noted above. \\n-Aseptic technique was maintained throughout procedure\\n-The patient tolerated the procedure well w/o incident. \\n-Vaseline applied to lips\\n-Additional product (hyaluronic ) may be necessary for optimal correction following treatment and/or maintenance. Consent: -Assessment \"Robi Aesthetics Scales\":\\nWrinkles: none-mild, Lines present at rest: none to mild, Folds: mild, Volume loss/skin laxity: mild. -Treatment areas reviewed with patient holding a hand held mirror. The patient has realistic expectations.  \\n-Written consent obtained. Topical anesthesia was achieved with 23% lidocaine, 7% tetracaine. Questions answered. Patient verbalized understanding of the content.  See written informed consent on file. \\n-Topical anesthesia(lot#[id number]@4, exp 9/12/19) was removed. \\n-Listerine mouth rinse provided.\\n-The skin was prepped with with alcohol and chlorhexadine prep. \\n-The filler was administered to the treatment areas noted above. \\n-Aseptic technique was maintained throughout procedure\\n-The patient tolerated the procedure well w/o incident. \\n-Vaseline applied to lips\\n-Additional product (hyaluronic ) may be necessary for optimal correction following treatment and/or maintenance.

## 2019-07-16 ENCOUNTER — OFFICE VISIT (OUTPATIENT)
Dept: PODIATRY | Facility: CLINIC | Age: 69
End: 2019-07-16
Payer: COMMERCIAL

## 2019-07-16 VITALS
BODY MASS INDEX: 27.71 KG/M2 | DIASTOLIC BLOOD PRESSURE: 70 MMHG | HEIGHT: 58 IN | SYSTOLIC BLOOD PRESSURE: 126 MMHG | WEIGHT: 132 LBS

## 2019-07-16 DIAGNOSIS — G57.61 MORTON'S NEUROMA OF THIRD INTERSPACE OF RIGHT FOOT: Primary | ICD-10-CM

## 2019-07-16 DIAGNOSIS — G57.92 NEURITIS OF LEFT FOOT: ICD-10-CM

## 2019-07-16 PROCEDURE — 64455 NJX AA&/STRD PLTR COM DG NRV: CPT | Performed by: PODIATRIST

## 2019-07-16 PROCEDURE — 99213 OFFICE O/P EST LOW 20 MIN: CPT | Mod: 25 | Performed by: PODIATRIST

## 2019-07-16 RX ORDER — TRIAMCINOLONE ACETONIDE 40 MG/ML
40 INJECTION, SUSPENSION INTRA-ARTICULAR; INTRAMUSCULAR ONCE
Status: DISCONTINUED | OUTPATIENT
Start: 2019-07-16 | End: 2019-12-27

## 2019-07-16 ASSESSMENT — MIFFLIN-ST. JEOR: SCORE: 1022.47

## 2019-07-16 NOTE — PROGRESS NOTES
"Foot & Ankle Surgery   July 16, 2019    S:  Pt is seen today for evaluation of 2 separate issues. 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  She's also been having some issues with the left 1st and 2nd toes.  \"not all the time\" but can have aches/pain lateral L hallux and medial R 2nd toe.  No knee issues and she \"can have\" lower back issues but states she wouldn't describe it as a chronic problem.    Vitals:    07/16/19 0800   BP: 126/70   Weight: 59.9 kg (132 lb)   Height: 1.48 m (4' 10.25\")   '      ROS - Pos for CC.  Patient denies current nausea, vomiting, chills, fevers, belly pain, calf pain, chest pain or SOB.  Complete remainder of ROS it otherwise neg.      PE:  Gen:   No apparent distress  Eye:    Visual scanning without deficit  Ear:    Response to auditory stimuli wnl  Lung:    Non-labored breathing on RA noted  Abd:    NTND per patient report  Lymph:    Neg for pitting/non-pitting edema BLE  Vasc:    Pulses palpable, CFT minimally delayed  Neuro:    Light touch sensation intact to all sensory nerve distributions without paresthesias  Derm:    Neg for nodules, lesions or ulcerations  MSK:    Right lower extremity - tenderness distal 3rd interspace.  Left lower extremity - minimal pain today but points between lateral L hallux and medial L 2nd toe.  Bony prominence dorsal midfoot. 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  nichols's neuroma R 3rd interspace sp 1 previous steroid injection  -steroid injection, see procedure note  -comfortable shoe gear  -RICE/NSAID vs tylenol  -discussed that next step would be excision of the nerve    After obtaining written consent, the skin was " prepped with alcohol.  The needle was advance to the underlying right 3rd common digital plantar nerve, aspiration was done with position change.  1 1/2cc mixture of 2:1 kenalog 40:0.25% marcaine plain was injected.  The patient tolerated the procedure without complication.  Risks that were discussed included possible joint/soft tissue damage, neuritis/numbness, infection, pigment change, steroid flare.     2.  Common peroneal neuritis left lower extremity   -discussed nerve impingment from shoe gear and dorsal midfoot spurring  -recommend shoe gear and lacing modifications  -RICE/NSAID vs tylenol prn based on pain  -consider diagnostic/therapeutic steroid injection     Follow up:  prn or sooner with acute issues      Body mass index is 27.35 kg/m .  Weight management plan: Patient was referred to their PCP to discuss a diet and exercise plan.         Sagar Martinez DPM FACFAS FACFAOM  Podiatric Foot & Ankle Surgeon  [facility]  [phone]

## 2019-10-01 ENCOUNTER — HEALTH MAINTENANCE LETTER (OUTPATIENT)
Age: 69
End: 2019-10-01

## 2019-10-15 ENCOUNTER — TRANSFERRED RECORDS (OUTPATIENT)
Dept: HEALTH INFORMATION MANAGEMENT | Facility: CLINIC | Age: 69
End: 2019-10-15

## 2019-11-25 ENCOUNTER — HOSPITAL ENCOUNTER (OUTPATIENT)
Dept: MAMMOGRAPHY | Facility: CLINIC | Age: 69
Discharge: HOME | End: 2019-11-25
Attending: INTERNAL MEDICINE | Admitting: INTERNAL MEDICINE
Payer: COMMERCIAL

## 2019-11-25 DIAGNOSIS — Z12.31 VISIT FOR SCREENING MAMMOGRAM: ICD-10-CM

## 2019-11-25 PROCEDURE — 77067 SCR MAMMO BI INCL CAD: CPT

## 2019-12-27 ENCOUNTER — OFFICE VISIT (OUTPATIENT)
Dept: INTERNAL MEDICINE | Facility: CLINIC | Age: 69
End: 2019-12-27
Payer: COMMERCIAL

## 2019-12-27 VITALS
DIASTOLIC BLOOD PRESSURE: 66 MMHG | TEMPERATURE: 97.5 F | BODY MASS INDEX: 28.34 KG/M2 | WEIGHT: 135 LBS | SYSTOLIC BLOOD PRESSURE: 162 MMHG | OXYGEN SATURATION: 98 % | HEIGHT: 58 IN | RESPIRATION RATE: 16 BRPM | HEART RATE: 67 BPM

## 2019-12-27 DIAGNOSIS — Z00.00 ENCOUNTER FOR ROUTINE ADULT HEALTH EXAMINATION WITHOUT ABNORMAL FINDINGS: ICD-10-CM

## 2019-12-27 DIAGNOSIS — E78.5 HYPERLIPIDEMIA LDL GOAL <130: ICD-10-CM

## 2019-12-27 DIAGNOSIS — Z78.0 ASYMPTOMATIC POSTMENOPAUSAL STATUS: ICD-10-CM

## 2019-12-27 DIAGNOSIS — Z23 NEED FOR INFLUENZA VACCINATION: Primary | ICD-10-CM

## 2019-12-27 DIAGNOSIS — I10 BENIGN ESSENTIAL HYPERTENSION: ICD-10-CM

## 2019-12-27 LAB
ANION GAP SERPL CALCULATED.3IONS-SCNC: 6 MMOL/L (ref 3–14)
BUN SERPL-MCNC: 19 MG/DL (ref 7–30)
CALCIUM SERPL-MCNC: 9.4 MG/DL (ref 8.5–10.1)
CHLORIDE SERPL-SCNC: 107 MMOL/L (ref 94–109)
CHOLEST SERPL-MCNC: 207 MG/DL
CO2 SERPL-SCNC: 27 MMOL/L (ref 20–32)
CREAT SERPL-MCNC: 0.84 MG/DL (ref 0.52–1.04)
GFR SERPL CREATININE-BSD FRML MDRD: 71 ML/MIN/{1.73_M2}
GLUCOSE SERPL-MCNC: 95 MG/DL (ref 70–99)
HDLC SERPL-MCNC: 48 MG/DL
LDLC SERPL CALC-MCNC: 117 MG/DL
NONHDLC SERPL-MCNC: 159 MG/DL
POTASSIUM SERPL-SCNC: 3.4 MMOL/L (ref 3.4–5.3)
SODIUM SERPL-SCNC: 140 MMOL/L (ref 133–144)
TRIGL SERPL-MCNC: 210 MG/DL

## 2019-12-27 PROCEDURE — 99207 C PAF COMPLETED  NO CHARGE: CPT | Mod: 25 | Performed by: INTERNAL MEDICINE

## 2019-12-27 PROCEDURE — 82043 UR ALBUMIN QUANTITATIVE: CPT | Performed by: INTERNAL MEDICINE

## 2019-12-27 PROCEDURE — 99213 OFFICE O/P EST LOW 20 MIN: CPT | Mod: 25 | Performed by: INTERNAL MEDICINE

## 2019-12-27 PROCEDURE — 80061 LIPID PANEL: CPT | Performed by: INTERNAL MEDICINE

## 2019-12-27 PROCEDURE — 90662 IIV NO PRSV INCREASED AG IM: CPT | Performed by: INTERNAL MEDICINE

## 2019-12-27 PROCEDURE — G0008 ADMIN INFLUENZA VIRUS VAC: HCPCS | Performed by: INTERNAL MEDICINE

## 2019-12-27 PROCEDURE — 80048 BASIC METABOLIC PNL TOTAL CA: CPT | Performed by: INTERNAL MEDICINE

## 2019-12-27 PROCEDURE — 99397 PER PM REEVAL EST PAT 65+ YR: CPT | Mod: 25 | Performed by: INTERNAL MEDICINE

## 2019-12-27 PROCEDURE — 36415 COLL VENOUS BLD VENIPUNCTURE: CPT | Performed by: INTERNAL MEDICINE

## 2019-12-27 RX ORDER — TRIAMTERENE AND HYDROCHLOROTHIAZIDE 37.5; 25 MG/1; MG/1
1 CAPSULE ORAL DAILY
Qty: 90 CAPSULE | Refills: 3 | Status: SHIPPED | OUTPATIENT
Start: 2019-12-27 | End: 2020-12-28

## 2019-12-27 ASSESSMENT — ENCOUNTER SYMPTOMS
DIZZINESS: 0
HEMATOCHEZIA: 0
CHILLS: 0
NERVOUS/ANXIOUS: 0
HEMATURIA: 0
COUGH: 0
ABDOMINAL PAIN: 0
FEVER: 0
FREQUENCY: 0
EYE PAIN: 0
DIARRHEA: 0
CONSTIPATION: 0

## 2019-12-27 ASSESSMENT — MIFFLIN-ST. JEOR: SCORE: 1036.08

## 2019-12-27 ASSESSMENT — ACTIVITIES OF DAILY LIVING (ADL): CURRENT_FUNCTION: NO ASSISTANCE NEEDED

## 2019-12-27 NOTE — PATIENT INSTRUCTIONS
Shingrix is the new shingles vaccine. Call insurance to find out if covered, whether covered at a pharmacy or doctor's office and the copay or check with pharmacy.   PREVENTIVE HEALTH RECOMMENDATIONS:   Vaccines: Get a flu shot each year. Get a tetanus shot every 10 years.   Exercise for at least 150 minutes a week (an average of 30 minutes a day, 5 days of the week). This will help you control your weight and prevent disease.  Limit alcohol to one drink per day.  No smoking.   Wear sunscreen to prevent skin cancer.   See your dentist twice a year for an exam and cleaning.  Try to get Calcium 1200 mg total per day. It is best to not take it all at once. Try to get Vitamin D at least 0677-8661 units (25-50 mcg) per day.  BMI or Body Mass Index is a way of indicating weight and health risk for cardiovascular diseases, high blood pressure, diabetes.   Definitions:    Underweight is less than 18.5 and will be associated with health risk.   Normal BMI is 18.5 to 25   Overweight is 25-29   Obesity is 30 or greater   Morbid Obesity is 40 or greater or 35 or greater with diabetes, prediabetes or abnormal blood sugar, high blood pressure or elevated cholesterol  Obesity and Morbid Obesity are associated with higher health risks. Lowering calories, exercising more may lower your BMI and even small decreases can have positive impact on lowering health risks.   Your Body mass index is 27.97 kg/m ..,

## 2019-12-27 NOTE — NURSING NOTE
"BP (!) 146/65 (BP Location: Right arm, Patient Position: Sitting, Cuff Size: Adult Regular)   Pulse 67   Temp 97.5  F (36.4  C) (Oral)   Resp 16   Ht 1.48 m (4' 10.25\")   Wt 61.2 kg (135 lb)   SpO2 98%   BMI 27.97 kg/m      "

## 2019-12-27 NOTE — PROGRESS NOTES
"SUBJECTIVE:   Kymberly Espinosa is a 69 year old female who presents for Preventive Visit.  Are you in the first 12 months of your Medicare coverage?  No    Healthy Habits:     In general, how would you rate your overall health?  Good    Frequency of exercise:  2-3 days/week    Duration of exercise:  30-45 minutes    Do you usually eat at least 4 servings of fruit and vegetables a day, include whole grains    & fiber and avoid regularly eating high fat or \"junk\" foods?  Yes    Taking medications regularly:  Yes    Ability to successfully perform activities of daily living:  No assistance needed    Home Safety:  No safety concerns identified    Hearing Impairment:  No hearing concerns    In the past 6 months, have you been bothered by leaking of urine?  No    In general, how would you rate your overall mental or emotional health?  Excellent      PHQ-2 Total Score: 0    Additional concerns today:  No  Do you feel safe in your environment? Yes  Have you ever done Advance Care Planning? (For example, a Health Directive, POLST, or a discussion with a medical provider or your loved ones about your wishes): Yes, advance care planning is on file.    Fall risk  Fallen 2 or more times in the past year?: No  Any fall with injury in the past year?: No  click delete button to remove this line now  Cognitive Screening   1) Repeat 3 items (Leader, Season, Table)    2) Clock draw: NORMAL  3) 3 item recall: Recalls 3 objects  Results: 3 items recalled: COGNITIVE IMPAIRMENT LESS LIKELY    Mini-CogTM Copyright ALINE Bates. Licensed by the author for use in [facility]; reprinted with permission ([email or web address]). All rights reserved.      Do you have sleep apnea, excessive snoring or daytime drowsiness?: yes    Current concerns:   None    Problems:   HTN; does not check BP.  Taking any NSAIDs, may have had a little salt with the holidays.  Hyperlipidemia: Diet is fairly stable      Reviewed and updated as needed this visit by " clinical staff  Allergies  Meds         Reviewed and updated as needed this visit by Provider        Social History     Tobacco Use     Smoking status: Former Smoker     Smokeless tobacco: Never Used   Substance Use Topics     Alcohol use: Yes     Comment: Casual     If you drink alcohol do you typically have >3 drinks per day or >7 drinks per week? Yes      Alcohol Use 12/20/2018   Prescreen: >3 drinks/day or >7 drinks/week? No   Prescreen: >3 drinks/day or >7 drinks/week? -       Current providers sharing in care for this patient include:   Patient Care Team:  Kimmie Hayes MD as PCP - General (Internal Medicine)  Kimmie Hayes MD as Assigned PCP    The following health maintenance items are reviewed in Epic and correct as of today:  Health Maintenance   Topic Date Due     ZOSTER IMMUNIZATION (2 of 3) 02/26/2009     PHQ-2  01/01/2019     INFLUENZA VACCINE (1) 09/01/2019     LIPID  12/20/2019     MICROALBUMIN  12/20/2019     FALL RISK ASSESSMENT  12/20/2019     MEDICARE ANNUAL WELLNESS VISIT  12/20/2019     MAMMO SCREENING  11/25/2020     ADVANCE CARE PLANNING  12/22/2020     DTAP/TDAP/TD IMMUNIZATION (2 - Td) 11/21/2022     COLONOSCOPY  04/24/2028     DEXA  Completed     HEPATITIS C SCREENING  Completed     PNEUMOCOCCAL IMMUNIZATION 65+ LOW/MEDIUM RISK  Completed     IPV IMMUNIZATION  Aged Out     MENINGITIS IMMUNIZATION  Aged Out       Patient Active Problem List   Diagnosis     Benign essential hypertension     Hyperlipidemia LDL goal <130     Advanced directives, counseling/discussion     Current Outpatient Medications   Medication Sig Dispense Refill     aspirin 162 MG EC tablet Take 81 mg by mouth daily        SOOLANTRA 1 % cream   11     triamterene-HCTZ (DYAZIDE) 37.5-25 MG capsule Take 1 capsule by mouth daily 90 capsule 3        Review of Systems   Constitutional: Negative for chills and fever.   HENT: Negative for congestion and ear pain.    Eyes: Negative for pain.   Respiratory: Negative for cough.   "  Cardiovascular: Negative for chest pain.   Gastrointestinal: Negative for abdominal pain, constipation, diarrhea and hematochezia.   Genitourinary: Negative for frequency and hematuria.   Neurological: Negative for dizziness.   Psychiatric/Behavioral: The patient is not nervous/anxious.      Negative skin, sees derm yearly       OBJECTIVE:      Physical Exam    Patient alert, in no acute distress  BP (!) 162/66   Pulse 67   Temp 97.5  F (36.4  C) (Oral)   Resp 16   Ht 1.48 m (4' 10.25\")   Wt 61.2 kg (135 lb)   SpO2 98%   BMI 27.97 kg/m      HEENT: extraocular movements are intact, pupils equal and reactive to light and accommodation, TMs clear, oropharynx clear  NECK: Neck supple. No adenopathy. Thyroid symmetric, normal size,, Carotids without bruits.  PULMONARY: clear to auscultation  CARDIAC: regular rate and rhythm and no murmurs, clicks, or gallops  PULSES: 2/2 throughout  BACK: no spinal or CVAT  ABDOMINAL: Soft, nontender.  Normal bowel sounds.  No hepatosplenomegaly or abnormal masses  BREAST: No breast masses or tenderness, No axillary masses or tenderness and No galactorrhea  REFLEXES: 2+ throughout      ASSESSMENT / PLAN:   1. Encounter for routine adult health examination without abnormal findings  Reviewed shingles vaccine  Reviewed aspirin use, she is very concerned because of family history of cardiovascular disease, overall risk is low so we will continue for now.    2. Benign essential hypertension  Blood pressure is high today, previous levels have been normal.  Recommend watch her salt and recheck in 3 weeks  - triamterene-HCTZ (DYAZIDE) 37.5-25 MG capsule; Take 1 capsule by mouth daily  Dispense: 90 capsule; Refill: 3  - Basic metabolic panel  (Ca, Cl, CO2, Creat, Gluc, K, Na, BUN)  - Albumin Random Urine Quantitative with Creat Ratio    3. Hyperlipidemia LDL goal <130  Recheck lab  - Lipid panel reflex to direct LDL Fasting    4. Asymptomatic postmenopausal status  due  - DX " "Hip/Pelvis/Spine; Future    COUNSELING:  Reviewed preventive health counseling, as reflected in patient instructions    Estimated body mass index is 27.35 kg/m  as calculated from the following:    Height as of 7/16/19: 1.48 m (4' 10.25\").    Weight as of 7/16/19: 59.9 kg (132 lb).         reports that she has quit smoking. She has never used smokeless tobacco.      Appropriate preventive services were discussed with this patient, including applicable screening as appropriate for cardiovascular disease, diabetes, osteopenia/osteoporosis, and glaucoma.  As appropriate for age/gender, discussed screening for colorectal cancer, prostate cancer, breast cancer, and cervical cancer. Checklist reviewing preventive services available has been given to the patient.    Reviewed patients plan of care and provided an AVS. The Basic Care Plan (routine screening as documented in Health Maintenance) for [place] meets the Care Plan requirement. This Care Plan has been established and reviewed with the Patient.    Counseling Resources:  ATP IV Guidelines  Pooled Cohorts Equation Calculator  Breast Cancer Risk Calculator  FRAX Risk Assessment  ICSI Preventive Guidelines  Dietary Guidelines for Americans, 2010  USDA's MyPlate  ASA Prophylaxis  Lung CA Screening    Kimmie Hayes MD  [facility]    Identified Health Risks:  "

## 2019-12-28 LAB
CREAT UR-MCNC: 141 MG/DL
MICROALBUMIN UR-MCNC: 13 MG/L
MICROALBUMIN/CREAT UR: 9.15 MG/G CR (ref 0–25)

## 2020-01-16 ENCOUNTER — MYC MEDICAL ADVICE (OUTPATIENT)
Dept: INTERNAL MEDICINE | Facility: CLINIC | Age: 70
End: 2020-01-16

## 2020-01-16 ENCOUNTER — APPOINTMENT (OUTPATIENT)
Age: 70
Setting detail: DERMATOLOGY
End: 2020-01-17

## 2020-01-16 DIAGNOSIS — Z41.9 ENCOUNTER FOR PROCEDURE FOR PURPOSES OTHER THAN REMEDYING HEALTH STATE, UNSPECIFIED: ICD-10-CM

## 2020-01-16 PROCEDURE — OTHER BOTOX: OTHER

## 2020-01-16 NOTE — PROCEDURE: BOTOX
Consent: Assessment :\\n“Robi Aesthetics Scales”\\n-Wrinkles= mild, Lines present at rest= mild-fine, Folds= mild, Volume Loss & Skin Laxity= mild.  \\n-Glogau Wrinkle Scale:  II-III\\n-Pt has naturally occurring heaviness in L brow compared to R.\\n\\n-Treatment areas reviewed with the patient while holding a hand-held mirror.  \\n-The patient has realistic expectations. \\n\\n-Informed patient that lower face Botox injections are considered off-label.\\n\\n-Written consent obtained. Patient verbalized understanding. Questions answered. See written consent on file. \\n\\n-Topical anesthesia  L23%T7% was applied for 10 minutes on upper lip;  Lot #[id number], exp 9/12/19 \\n-The skin was prepped with alcohol, skin markings made and injections provided. \\n-The treatment was administered to the area(s) noted.\\n-The patient tolerated the procedure well w/o incident.  \\n-Additional product (Botox) may be necessary for optimal correction following treatment and/or maintenance.\\n-Patient instructed to not lie down for 4-6 hours.  Patient instructed not to massage or manipulate treatment areas (avoid facial treatments or Clarisonic Brush) and limit physical activity for 24 hours.\\n\\n-Calipers used\\n\\n-Recommended:  Voluma temples, cheeks, chin and lateral jawline for structure.\\n-Instructed to avoid: NSAIDs, Vitamin E, Fish oil, Ginkgo Biloba  and alcohol 5-7 days before filler treatment\\n-Skin Care: Obagi hydroquinone, tretinoin Consent: Assessment :\\n“Robi Aesthetics Scales”\\n-Wrinkles= mild, Lines present at rest= mild-fine, Folds= mild, Volume Loss & Skin Laxity= mild.  \\n-Glogau Wrinkle Scale:  II-III\\n-Pt has naturally occurring heaviness in L brow compared to R.\\n\\n-Treatment areas reviewed with the patient while holding a hand-held mirror.  \\n-The patient has realistic expectations. \\n\\n-Informed patient that lower face Botox injections are considered off-label.\\n\\n-Written consent obtained. Patient verbalized understanding. Questions answered. See written consent on file. \\n\\n-Topical anesthesia  L23%T7% was applied for 10 minutes on upper lip;  Lot #[id number], exp 9/12/19 \\n-The skin was prepped with alcohol, skin markings made and injections provided. \\n-The treatment was administered to the area(s) noted.\\n-The patient tolerated the procedure well w/o incident.  \\n-Additional product (Botox) may be necessary for optimal correction following treatment and/or maintenance.\\n-Patient instructed to not lie down for 4-6 hours.  Patient instructed not to massage or manipulate treatment areas (avoid facial treatments or Clarisonic Brush) and limit physical activity for 24 hours.\\n\\n-Calipers used\\n\\n-Recommended:  Voluma temples, cheeks, chin and lateral jawline for structure.\\n-Instructed to avoid: NSAIDs, Vitamin E, Fish oil, Ginkgo Biloba  and alcohol 5-7 days before filler treatment\\n-Skin Care: Obagi hydroquinone, tretinoin

## 2020-01-20 ENCOUNTER — OFFICE VISIT (OUTPATIENT)
Dept: NURSING | Facility: CLINIC | Age: 70
End: 2020-01-20
Payer: COMMERCIAL

## 2020-01-20 VITALS — SYSTOLIC BLOOD PRESSURE: 130 MMHG | DIASTOLIC BLOOD PRESSURE: 60 MMHG

## 2020-01-20 DIAGNOSIS — I10 BENIGN ESSENTIAL HYPERTENSION: Primary | ICD-10-CM

## 2020-01-20 NOTE — NURSING NOTE
"Chief Complaint   Patient presents with     Allied Health Visit     BP check     initial /60 (BP Location: Left arm, Patient Position: Sitting, Cuff Size: Adult Regular)  Estimated body mass index is 27.97 kg/m  as calculated from the following:    Height as of 12/27/19: 1.48 m (4' 10.25\").    Weight as of 12/27/19: 61.2 kg (135 lb)..  bp completed using cuff size regular    BP reading on pt's machine- 153/62. Pt is feeling fine no symptoms.     Pt's out pt readings:     1/5/2020 131/78  1/6/2020 146/66  1/8/2020 123/59  1/9/2020 128/65  1/11/2020 126/62  1/16/2020 138/59  1/202020 151/64    "

## 2020-01-21 NOTE — PROGRESS NOTES
Patient had been instructed to bring her home blood pressure machine with her, did she do this?  If so what was her blood pressure reading on her home machine?

## 2020-02-24 ENCOUNTER — ANCILLARY PROCEDURE (OUTPATIENT)
Dept: BONE DENSITY | Facility: CLINIC | Age: 70
End: 2020-02-24
Attending: INTERNAL MEDICINE
Payer: COMMERCIAL

## 2020-02-24 DIAGNOSIS — Z78.0 ASYMPTOMATIC POSTMENOPAUSAL STATUS: ICD-10-CM

## 2020-02-24 PROCEDURE — 77080 DXA BONE DENSITY AXIAL: CPT | Performed by: INTERNAL MEDICINE

## 2020-09-25 ENCOUNTER — APPOINTMENT (OUTPATIENT)
Dept: URBAN - METROPOLITAN AREA CLINIC 256 | Age: 70
Setting detail: DERMATOLOGY
End: 2020-09-25

## 2020-12-08 ENCOUNTER — HOSPITAL ENCOUNTER (OUTPATIENT)
Dept: MAMMOGRAPHY | Facility: CLINIC | Age: 70
Discharge: HOME | End: 2020-12-08
Attending: INTERNAL MEDICINE | Admitting: INTERNAL MEDICINE
Payer: COMMERCIAL

## 2020-12-08 DIAGNOSIS — Z12.31 VISIT FOR SCREENING MAMMOGRAM: ICD-10-CM

## 2020-12-08 PROCEDURE — 77067 SCR MAMMO BI INCL CAD: CPT

## 2020-12-27 DIAGNOSIS — I10 BENIGN ESSENTIAL HYPERTENSION: ICD-10-CM

## 2020-12-28 ENCOUNTER — OFFICE VISIT (OUTPATIENT)
Dept: INTERNAL MEDICINE | Facility: CLINIC | Age: 70
End: 2020-12-28
Payer: COMMERCIAL

## 2020-12-28 VITALS
RESPIRATION RATE: 18 BRPM | SYSTOLIC BLOOD PRESSURE: 139 MMHG | BODY MASS INDEX: 28.97 KG/M2 | OXYGEN SATURATION: 95 % | WEIGHT: 138 LBS | DIASTOLIC BLOOD PRESSURE: 70 MMHG | HEART RATE: 69 BPM | HEIGHT: 58 IN | TEMPERATURE: 98.4 F

## 2020-12-28 DIAGNOSIS — I10 BENIGN ESSENTIAL HYPERTENSION: ICD-10-CM

## 2020-12-28 DIAGNOSIS — Z00.00 ENCOUNTER FOR ANNUAL WELLNESS EXAM IN MEDICARE PATIENT: Primary | ICD-10-CM

## 2020-12-28 DIAGNOSIS — E78.5 HYPERLIPIDEMIA LDL GOAL <130: ICD-10-CM

## 2020-12-28 LAB
ANION GAP SERPL CALCULATED.3IONS-SCNC: 6 MMOL/L (ref 3–14)
BUN SERPL-MCNC: 19 MG/DL (ref 7–30)
CALCIUM SERPL-MCNC: 9.5 MG/DL (ref 8.5–10.1)
CHLORIDE SERPL-SCNC: 107 MMOL/L (ref 94–109)
CHOLEST SERPL-MCNC: 212 MG/DL
CO2 SERPL-SCNC: 27 MMOL/L (ref 20–32)
CREAT SERPL-MCNC: 0.76 MG/DL (ref 0.52–1.04)
CREAT UR-MCNC: 108 MG/DL
GFR SERPL CREATININE-BSD FRML MDRD: 79 ML/MIN/{1.73_M2}
GLUCOSE SERPL-MCNC: 97 MG/DL (ref 70–99)
HDLC SERPL-MCNC: 40 MG/DL
LDLC SERPL CALC-MCNC: 124 MG/DL
MICROALBUMIN UR-MCNC: 11 MG/L
MICROALBUMIN/CREAT UR: 10.19 MG/G CR (ref 0–25)
NONHDLC SERPL-MCNC: 172 MG/DL
POTASSIUM SERPL-SCNC: 3.9 MMOL/L (ref 3.4–5.3)
SODIUM SERPL-SCNC: 139 MMOL/L (ref 133–144)
TRIGL SERPL-MCNC: 240 MG/DL

## 2020-12-28 PROCEDURE — 36415 COLL VENOUS BLD VENIPUNCTURE: CPT | Performed by: INTERNAL MEDICINE

## 2020-12-28 PROCEDURE — 80061 LIPID PANEL: CPT | Performed by: INTERNAL MEDICINE

## 2020-12-28 PROCEDURE — 82043 UR ALBUMIN QUANTITATIVE: CPT | Performed by: INTERNAL MEDICINE

## 2020-12-28 PROCEDURE — 80048 BASIC METABOLIC PNL TOTAL CA: CPT | Performed by: INTERNAL MEDICINE

## 2020-12-28 PROCEDURE — 99213 OFFICE O/P EST LOW 20 MIN: CPT | Mod: 25 | Performed by: INTERNAL MEDICINE

## 2020-12-28 PROCEDURE — 99397 PER PM REEVAL EST PAT 65+ YR: CPT | Performed by: INTERNAL MEDICINE

## 2020-12-28 RX ORDER — CALCIUM CARBONATE 500(1250)
1 TABLET ORAL DAILY
COMMUNITY

## 2020-12-28 RX ORDER — CHLORAL HYDRATE 500 MG
2 CAPSULE ORAL DAILY
COMMUNITY
End: 2023-07-13

## 2020-12-28 RX ORDER — TRIAMTERENE AND HYDROCHLOROTHIAZIDE 37.5; 25 MG/1; MG/1
1 CAPSULE ORAL DAILY
Qty: 90 CAPSULE | Refills: 3 | Status: SHIPPED | OUTPATIENT
Start: 2020-12-28 | End: 2021-12-17

## 2020-12-28 RX ORDER — MULTIPLE VITAMINS W/ MINERALS TAB 9MG-400MCG
1 TAB ORAL DAILY
COMMUNITY

## 2020-12-28 ASSESSMENT — ENCOUNTER SYMPTOMS
COUGH: 0
HEARTBURN: 0
JOINT SWELLING: 0
EYE PAIN: 0
BREAST MASS: 0
CONSTIPATION: 0
HEADACHES: 0
ABDOMINAL PAIN: 0
NERVOUS/ANXIOUS: 0
HEMATOCHEZIA: 0
NAUSEA: 0
PALPITATIONS: 0
HEMATURIA: 0
FREQUENCY: 0
WEAKNESS: 0
DIARRHEA: 0
MYALGIAS: 0
FEVER: 0
PARESTHESIAS: 0
SORE THROAT: 0
CHILLS: 0
DYSURIA: 0
ARTHRALGIAS: 0
DIZZINESS: 0
SHORTNESS OF BREATH: 0

## 2020-12-28 ASSESSMENT — MIFFLIN-ST. JEOR: SCORE: 1040.71

## 2020-12-28 ASSESSMENT — ACTIVITIES OF DAILY LIVING (ADL): CURRENT_FUNCTION: NO ASSISTANCE NEEDED

## 2020-12-28 NOTE — PROGRESS NOTES
"SUBJECTIVE:   Kymberly Espinosa is a 70 year old female who presents for Preventive Visit.      Patient has been advised of split billing requirements and indicates understanding: Yes   Are you in the first 12 months of your Medicare coverage?  No    Healthy Habits:     In general, how would you rate your overall health?  Excellent    Frequency of exercise:  2-3 days/week    Duration of exercise:  30-45 minutes    Do you usually eat at least 4 servings of fruit and vegetables a day, include whole grains    & fiber and avoid regularly eating high fat or \"junk\" foods?  Yes    Taking medications regularly:  Yes    Medication side effects:  None    Ability to successfully perform activities of daily living:  No assistance needed    Home Safety:  No safety concerns identified    Hearing Impairment:  No hearing concerns    In the past 6 months, have you been bothered by leaking of urine?  No    In general, how would you rate your overall mental or emotional health?  Excellent      PHQ-2 Total Score: 0    Additional concerns today:  No    Do you feel safe in your environment? Yes    Have you ever done Advance Care Planning? (For example, a Health Directive, POLST, or a discussion with a medical provider or your loved ones about your wishes): Yes, advance care planning is on file.      Fall risk  Fallen 2 or more times in the past year?: No  Any fall with injury in the past year?: No    Cognitive Screening   1) Repeat 3 items (Leader, Season, Table)    2) Clock draw: NORMAL  3) 3 item recall: Recalls NO objects   Results: 3 items recalled: COGNITIVE IMPAIRMENT LESS LIKELY    Mini-CogTM Copyright ALINE Bates. Licensed by the author for use in [facility]; reprinted with permission ([email or web address]). All rights reserved.      Do you have sleep apnea, excessive snoring or daytime drowsiness?: no    Reviewed and updated as needed this visit by clinical staff                Problems:  1.  Hypertension: Blood pressure " checks at home have been good, no concerns about her medication, no symptoms of chest pain, palpitations, shortness of breath  2. Hyperlipidemia: Diet is stable.     Reviewed and updated as needed this visit by Provider                Social History     Tobacco Use     Smoking status: Former Smoker     Smokeless tobacco: Never Used   Substance Use Topics     Alcohol use: Yes     Comment: Casual     If you drink alcohol do you typically have >3 drinks per day or >7 drinks per week? No    Alcohol Use 12/27/2019   Prescreen: >3 drinks/day or >7 drinks/week? No   Prescreen: >3 drinks/day or >7 drinks/week? -   No flowsheet data found.      Current providers sharing in care for this patient include:   Patient Care Team:  Kimmie Hayes MD as PCP - General (Internal Medicine)  Kimmie Hayes MD as Assigned PCP  Sagar Martinez DPM as Assigned Musculoskeletal Provider    The following health maintenance items are reviewed in Epic and correct as of today:  Health Maintenance   Topic Date Due     ZOSTER IMMUNIZATION (2 of 3) 02/26/2009     PHQ-2  01/01/2020     LIPID  12/27/2020     MICROALBUMIN  12/27/2020     FALL RISK ASSESSMENT  12/27/2020     MEDICARE ANNUAL WELLNESS VISIT  12/27/2020     MAMMO SCREENING  12/08/2021     DTAP/TDAP/TD IMMUNIZATION (2 - Td) 11/21/2022     ADVANCE CARE PLANNING  12/27/2024     COLORECTAL CANCER SCREENING  04/24/2028     DEXA  02/24/2035     HEPATITIS C SCREENING  Completed     INFLUENZA VACCINE  Completed     Pneumococcal Vaccine: 65+ Years  Completed     Pneumococcal Vaccine: Pediatrics (0 to 5 Years) and At-Risk Patients (6 to 64 Years)  Aged Out     IPV IMMUNIZATION  Aged Out     MENINGITIS IMMUNIZATION  Aged Out     HEPATITIS B IMMUNIZATION  Aged Out         Review of Systems   Constitutional: Negative for chills and fever.   HENT: Negative for congestion, ear pain, hearing loss and sore throat.    Eyes: Negative for pain and visual disturbance.   Respiratory: Negative for cough and  "shortness of breath.    Cardiovascular: Negative for chest pain, palpitations and peripheral edema.   Gastrointestinal: Negative for abdominal pain, constipation, diarrhea, heartburn, hematochezia and nausea.   Breasts:  Negative for tenderness, breast mass and discharge.   Genitourinary: Negative for dysuria, frequency, genital sores, hematuria, pelvic pain, urgency, vaginal bleeding and vaginal discharge.   Musculoskeletal: Negative for arthralgias, joint swelling and myalgias.   Skin: Negative for rash.   Neurological: Negative for dizziness, weakness, headaches and paresthesias.   Psychiatric/Behavioral: Negative for mood changes. The patient is not nervous/anxious.        OBJECTIVE:      Patient alert, in no acute distress  /70 (BP Location: Left arm, Patient Position: Sitting, Cuff Size: Adult Regular)   Pulse 69   Temp 98.4  F (36.9  C) (Oral)   Resp 18   Ht 1.473 m (4' 10\")   Wt 62.6 kg (138 lb)   SpO2 95%   BMI 28.84 kg/m      HEENT: extraocular movements are intact, pupils equal and reactive to light and accommodation, TMs clear  NECK: Neck supple. No adenopathy. Thyroid symmetric, normal size,, Carotids without bruits.  PULMONARY: clear to auscultation  CARDIAC: regular rate and rhythm and no murmurs, clicks, or gallops  PULSES: 2/2 throughout  BACK: no spinal or CVAT  ABDOMINAL: Soft, nontender.  Normal bowel sounds.  No hepatosplenomegaly or abnormal masses  REFLEXES: 1+ throughout       ASSESSMENT / PLAN:   1. Encounter for annual wellness exam in Medicare patient  Up to date, will consider shingles vaccine, advised about vitamin D, Covid vaccination    2. Benign essential hypertension  Controlled, continue medication, check blood pressure occasionally, call if it is greater than 140/90  - triamterene-HCTZ (DYAZIDE) 37.5-25 MG capsule; Take 1 capsule by mouth daily  Dispense: 90 capsule; Refill: 3  - Basic metabolic panel  (Ca, Cl, CO2, Creat, Gluc, K, Na, BUN)  - Albumin Random Urine " "Quantitative with Creat Ratio    3. Hyperlipidemia LDL goal <130  Recheck labs  - Lipid panel reflex to direct LDL Fasting    Patient has been advised of split billing requirements and indicates understanding: Yes  COUNSELING:  Reviewed preventive health counseling, as reflected in patient instructions    Estimated body mass index is 27.97 kg/m  as calculated from the following:    Height as of 12/27/19: 1.48 m (4' 10.25\").    Weight as of 12/27/19: 61.2 kg (135 lb).        She reports that she has quit smoking. She has never used smokeless tobacco.      Appropriate preventive services were discussed with this patient, including applicable screening as appropriate for cardiovascular disease, diabetes, osteopenia/osteoporosis, and glaucoma.  As appropriate for age/gender, discussed screening for colorectal cancer, prostate cancer, breast cancer, and cervical cancer. Checklist reviewing preventive services available has been given to the patient.    Reviewed patients plan of care and provided an AVS. The Basic Care Plan (routine screening as documented in Health Maintenance) for [place] meets the Care Plan requirement. This Care Plan has been established and reviewed with the Patient.    Counseling Resources:  ATP IV Guidelines  Pooled Cohorts Equation Calculator  Breast Cancer Risk Calculator  Breast Cancer: Medication to Reduce Risk  FRAX Risk Assessment  ICSI Preventive Guidelines  Dietary Guidelines for Americans, 2010  [employer]'s MyPlate  ASA Prophylaxis  Lung CA Screening    Kimmie Hayes MD  [facility]    Identified Health Risks:  "

## 2020-12-28 NOTE — NURSING NOTE
"BP (!) 140/70 (BP Location: Left arm, Patient Position: Sitting, Cuff Size: Adult Regular)   Pulse 69   Temp 98.4  F (36.9  C) (Oral)   Resp 18   Ht 1.473 m (4' 10\")   Wt 62.6 kg (138 lb)   SpO2 95%   BMI 28.84 kg/m      "

## 2020-12-28 NOTE — PATIENT INSTRUCTIONS
PREVENTIVE HEALTH RECOMMENDATIONS:   Vaccines: Get a flu shot each year. Get a tetanus shot every 10 years.   Exercise for at least 150 minutes a week (an average of 30 minutes a day, 5 days of the week). This will help you control your weight and prevent disease.  Limit alcohol to one drink per day.  Wear sunscreen to prevent skin cancer.   See your dentist twice a year for an exam and cleaning.    Try to get Calcium 1200 mg total per day. It is best to not take it all at once. Try to get Vitamin D at least 2000 units (25-50 mcg) per day.    BMI or Body Mass Index is a way of indicating weight and health risk for cardiovascular diseases, high blood pressure, diabetes.   Definitions:    Underweight is less than 18.5 and will be associated with health risk.   Normal BMI is 18.5 to 25   Overweight is 25-29   Obesity is 30 or greater   Morbid Obesity is 40 or greater or 35 or greater with diabetes, prediabetes or abnormal blood sugar, high blood pressure or elevated cholesterol  Obesity and Morbid Obesity are associated with higher health risks. Lowering calories, exercising more may lower your BMI and even small decreases can have positive impact on lowering health risks.   Your Body mass index is 28.84 kg/m ..,

## 2020-12-30 RX ORDER — TRIAMTERENE AND HYDROCHLOROTHIAZIDE 37.5; 25 MG/1; MG/1
1 CAPSULE ORAL DAILY
Qty: 90 CAPSULE | Refills: 0 | OUTPATIENT
Start: 2020-12-30

## 2020-12-30 NOTE — TELEPHONE ENCOUNTER
Pending Prescriptions:                       Disp   Refills    triamterene-HCTZ (DYAZIDE) 37.5-25 MG cap*90 cap*0            Sig: Take 1 capsule by mouth daily      Duplicate.  Medication e-scribed 12-28-20.

## 2021-03-04 ENCOUNTER — IMMUNIZATION (OUTPATIENT)
Dept: NURSING | Facility: CLINIC | Age: 71
End: 2021-03-04
Payer: COMMERCIAL

## 2021-03-04 PROCEDURE — 91300 PR COVID VAC PFIZER DIL RECON 30 MCG/0.3 ML IM: CPT

## 2021-03-04 PROCEDURE — 0001A PR COVID VAC PFIZER DIL RECON 30 MCG/0.3 ML IM: CPT

## 2021-03-25 ENCOUNTER — IMMUNIZATION (OUTPATIENT)
Dept: NURSING | Facility: CLINIC | Age: 71
End: 2021-03-25
Attending: INTERNAL MEDICINE
Payer: COMMERCIAL

## 2021-03-25 PROCEDURE — 0002A PR COVID VAC PFIZER DIL RECON 30 MCG/0.3 ML IM: CPT

## 2021-03-25 PROCEDURE — 91300 PR COVID VAC PFIZER DIL RECON 30 MCG/0.3 ML IM: CPT

## 2021-05-21 DIAGNOSIS — Z11.59 ENCOUNTER FOR SCREENING FOR OTHER VIRAL DISEASES: ICD-10-CM

## 2021-06-04 ENCOUNTER — HOSPITAL ENCOUNTER (OUTPATIENT)
Dept: LAB | Facility: CLINIC | Age: 71
Discharge: HOME | End: 2021-06-04
Attending: COLON & RECTAL SURGERY | Admitting: COLON & RECTAL SURGERY
Payer: COMMERCIAL

## 2021-06-04 DIAGNOSIS — Z11.59 ENCOUNTER FOR SCREENING FOR OTHER VIRAL DISEASES: ICD-10-CM

## 2021-06-04 LAB
SARS-COV-2 RNA RESP QL NAA+PROBE: NORMAL
SPECIMEN SOURCE: NORMAL

## 2021-06-04 PROCEDURE — U0003 INFECTIOUS AGENT DETECTION BY NUCLEIC ACID (DNA OR RNA); SEVERE ACUTE RESPIRATORY SYNDROME CORONAVIRUS 2 (SARS-COV-2) (CORONAVIRUS DISEASE [COVID-19]), AMPLIFIED PROBE TECHNIQUE, MAKING USE OF HIGH THROUGHPUT TECHNOLOGIES AS DESCRIBED BY CMS-2020-01-R: HCPCS | Performed by: COLON & RECTAL SURGERY

## 2021-06-04 PROCEDURE — U0005 INFEC AGEN DETEC AMPLI PROBE: HCPCS | Performed by: COLON & RECTAL SURGERY

## 2021-06-05 LAB
LABORATORY COMMENT REPORT: NORMAL
SARS-COV-2 RNA RESP QL NAA+PROBE: NEGATIVE
SPECIMEN SOURCE: NORMAL

## 2021-06-08 ENCOUNTER — HOSPITAL ENCOUNTER (OUTPATIENT)
Facility: CLINIC | Age: 71
Discharge: HOME | End: 2021-06-08
Attending: COLON & RECTAL SURGERY | Admitting: COLON & RECTAL SURGERY
Payer: COMMERCIAL

## 2021-06-08 VITALS
DIASTOLIC BLOOD PRESSURE: 63 MMHG | RESPIRATION RATE: 16 BRPM | BODY MASS INDEX: 27.42 KG/M2 | HEIGHT: 59 IN | TEMPERATURE: 97.9 F | WEIGHT: 136 LBS | HEART RATE: 55 BPM | OXYGEN SATURATION: 94 % | SYSTOLIC BLOOD PRESSURE: 131 MMHG

## 2021-06-08 LAB — COLONOSCOPY: NORMAL

## 2021-06-08 PROCEDURE — 45380 COLONOSCOPY AND BIOPSY: CPT | Mod: PT,XU

## 2021-06-08 PROCEDURE — 88305 TISSUE EXAM BY PATHOLOGIST: CPT | Mod: TC | Performed by: COLON & RECTAL SURGERY

## 2021-06-08 PROCEDURE — 88305 TISSUE EXAM BY PATHOLOGIST: CPT | Mod: 26 | Performed by: PATHOLOGY

## 2021-06-08 PROCEDURE — 99153 MOD SED SAME PHYS/QHP EA: CPT | Performed by: COLON & RECTAL SURGERY

## 2021-06-08 PROCEDURE — 250N000011 HC RX IP 250 OP 636: Performed by: COLON & RECTAL SURGERY

## 2021-06-08 PROCEDURE — G0500 MOD SEDAT ENDO SERVICE >5YRS: HCPCS | Performed by: COLON & RECTAL SURGERY

## 2021-06-08 PROCEDURE — 45385 COLONOSCOPY W/LESION REMOVAL: CPT | Mod: PT | Performed by: COLON & RECTAL SURGERY

## 2021-06-08 RX ORDER — PROCHLORPERAZINE MALEATE 5 MG
5 TABLET ORAL EVERY 6 HOURS PRN
Status: DISCONTINUED | OUTPATIENT
Start: 2021-06-08 | End: 2021-06-08 | Stop reason: HOSPADM

## 2021-06-08 RX ORDER — ONDANSETRON 4 MG/1
4 TABLET, ORALLY DISINTEGRATING ORAL EVERY 6 HOURS PRN
Status: DISCONTINUED | OUTPATIENT
Start: 2021-06-08 | End: 2021-06-08 | Stop reason: HOSPADM

## 2021-06-08 RX ORDER — NALOXONE HYDROCHLORIDE 0.4 MG/ML
0.2 INJECTION, SOLUTION INTRAMUSCULAR; INTRAVENOUS; SUBCUTANEOUS
Status: DISCONTINUED | OUTPATIENT
Start: 2021-06-08 | End: 2021-06-08 | Stop reason: HOSPADM

## 2021-06-08 RX ORDER — FENTANYL CITRATE 50 UG/ML
INJECTION, SOLUTION INTRAMUSCULAR; INTRAVENOUS PRN
Status: COMPLETED | OUTPATIENT
Start: 2021-06-08 | End: 2021-06-08

## 2021-06-08 RX ORDER — NALOXONE HYDROCHLORIDE 0.4 MG/ML
0.4 INJECTION, SOLUTION INTRAMUSCULAR; INTRAVENOUS; SUBCUTANEOUS
Status: DISCONTINUED | OUTPATIENT
Start: 2021-06-08 | End: 2021-06-08 | Stop reason: HOSPADM

## 2021-06-08 RX ORDER — LIDOCAINE 40 MG/G
CREAM TOPICAL
Status: DISCONTINUED | OUTPATIENT
Start: 2021-06-08 | End: 2021-06-08 | Stop reason: HOSPADM

## 2021-06-08 RX ORDER — ONDANSETRON 2 MG/ML
4 INJECTION INTRAMUSCULAR; INTRAVENOUS EVERY 6 HOURS PRN
Status: DISCONTINUED | OUTPATIENT
Start: 2021-06-08 | End: 2021-06-08 | Stop reason: HOSPADM

## 2021-06-08 RX ORDER — ONDANSETRON 2 MG/ML
4 INJECTION INTRAMUSCULAR; INTRAVENOUS
Status: DISCONTINUED | OUTPATIENT
Start: 2021-06-08 | End: 2021-06-08 | Stop reason: HOSPADM

## 2021-06-08 RX ORDER — FLUMAZENIL 0.1 MG/ML
0.2 INJECTION, SOLUTION INTRAVENOUS
Status: DISCONTINUED | OUTPATIENT
Start: 2021-06-08 | End: 2021-06-08 | Stop reason: HOSPADM

## 2021-06-08 RX ADMIN — FENTANYL CITRATE 50 MCG: 50 INJECTION, SOLUTION INTRAMUSCULAR; INTRAVENOUS at 09:58

## 2021-06-08 RX ADMIN — MIDAZOLAM 1 MG: 1 INJECTION INTRAMUSCULAR; INTRAVENOUS at 09:54

## 2021-06-08 RX ADMIN — FENTANYL CITRATE 50 MCG: 50 INJECTION, SOLUTION INTRAMUSCULAR; INTRAVENOUS at 10:03

## 2021-06-08 RX ADMIN — MIDAZOLAM 1 MG: 1 INJECTION INTRAMUSCULAR; INTRAVENOUS at 09:52

## 2021-06-08 RX ADMIN — FENTANYL CITRATE 100 MCG: 50 INJECTION, SOLUTION INTRAMUSCULAR; INTRAVENOUS at 09:47

## 2021-06-08 RX ADMIN — MIDAZOLAM 2 MG: 1 INJECTION INTRAMUSCULAR; INTRAVENOUS at 09:47

## 2021-06-08 ASSESSMENT — MIFFLIN-ST. JEOR: SCORE: 1034.58

## 2021-06-08 NOTE — DISCHARGE INSTRUCTIONS
Eating a High-Fiber Diet  Fiber is what gives strength and structure to plants. Most grains, beans, vegetables, and fruits contain fiber. Foods rich in fiber are often low in calories and fat, and they fill you up more. They may also reduce your risks for certain health problems. To find out the amount of fiber in canned, packaged, or frozen foods, read the  Nutrition Facts  label. It tells you how much fiber is in a serving.      Types of Fiber and Their Benefits  There are two types of fiber: insoluble and soluble. They both aid digestion and help you maintain a healthy weight.  Insoluble fiber: This is found in whole grains, cereals, certain fruits and vegetables (such as apple skin, corn, and carrots). Insoluble fiber may prevent constipation and reduce the risk of certain types of cancer.   Soluble fiber: This type of fiber is in oats, beans, and certain fruits and vegetables (such as strawberries and peas). Soluble fiber can reduce cholesterol (which may help lower the risk of heart disease), and helps control blood sugar levels.  Look for High-Fiber Foods  Whole-grain breads and cereals: Try to eat 6-8 ounces a day. Include wheat and oat bran cereals, whole-wheat muffins or toast, and corn tortillas in your meals.  Fruits: Try to eat 2 cups a day. Apples, oranges, strawberries, pears, and bananas are good sources. (Note: Fruit juice is low in fiber.)  Vegetables: Try to eat 3 cups a day. Add asparagus, carrots, broccoli, peas, and corn to your meals.  Legumes (beans): One cup of cooked lentils gives you over 15 grams of fiber. Try navy beans, lentils, and chickpeas.  Seeds:  A small handful of seeds gives you about 3 grams of fiber. Try sunflower seeds.    Keep Track of Your Fiber  A healthy diet includes 31 grams of fiber a day if you have a 2,000-calorie diet. Keep track of how much fiber you eat. Start by reading food labels. Then eat a variety of foods high in fiber. Ask your doctor about supplemental  fiber products.            5928-9532 [facility], [address] [place] [address], [place] [address]. All rights reserved. This information is not intended as a substitute for professional medical care. Always follow your healthcare professional's instructions.    Understanding Diverticulosis and Diverticulitis     Pouches or diverticula usually occur in the lower part of the colon called the sigmoid.      Diverticulitis occurs when the pouches become inflamed.     The colon (large intestine) is the last part of the digestive tract. It absorbs water from stool and changes it from a liquid to a solid. In certain cases, small pouches called diverticula can form in the colon wall. This condition is called diverticulosis. The pouches can become infected. If this happens, it becomes a more serious problem called diverticulitis. These problems can be painful. But they can be managed.   Managing Your Condition  Diet changes or taking medications are often tried first. These may be enough to bring relief. If the case is bad, surgery may be done. You and your doctor can discuss the plan that is best for you.  If You Have Diverticulosis  Diet changes are often enough to control symptoms. The main changes are adding fiber (roughage) and drinking more water. Fiber absorbs water as it travels through your colon. This helps your stool stay soft and move smoothly. Water helps this process. If needed, you may be told to take over-the-counter stool softeners. To help relieve pain, antispasmodic medications may be prescribed.  If You Have Diverticulitis  Treatment depends on how bad your symptoms are.  For mild symptoms: You may be put on a liquid diet for a short time. You may also be prescribed antibiotics. If these two steps relieve your symptoms, you may then be prescribed a high-fiber diet. If you still have symptoms, your doctor will discuss further treatment options with you.  For severe symptoms: You may need to be admitted to the  hospital. There, you can be given IV antibiotics and fluids. Once symptoms are under control, the above treatments may be tried. If these don t control your condition, your doctor may discuss the option of having surgery with you.  [place] to Colon Health  Help keep your colon healthy with a diet that includes plenty of high-fiber fruits, vegetables, and whole grains. Drink plenty of liquids like water and juice. Your doctor may also recommend avoiding seeds and nuts.          [id number] [facility], [address] [place] [address], [place] [address]. All rights reserved. This information is not intended as a substitute for professional medical care. Always follow your healthcare professional's instructions.    Understanding Colon and Rectal Polyps     The colon has a smooth lining composed of millions of cells.     The colon (also called the large intestine) is a muscular tube that forms the last part of the digestive tract. It absorbs water and stores food waste. The colon is about 4 to 6 feet long. The rectum is the last 6 inches of the colon. The colon and rectum have a smooth lining composed of millions of cells. Changes in these cells can lead to growths in the colon that can become cancerous and should be removed.     When the Colon Lining Changes  Changes that occur in the cells that line the colon or rectum can lead to growths called polyps. Over a period of years, polyps can turn cancerous. Removing polyps early may prevent cancer from ever forming.      Polyps  Polyps are fleshy clumps of tissue that form on the lining of the colon or rectum. Small polyps are usually benign (not cancerous). However, over time, cells in a polyp can change and become cancerous. The larger a polyp grows, the more likely this is to happen. Also, certain types of polyps known as adenomatous polyps are considered premalignant. This means that they will almost always become cancerous if they re not removed.          Cancer  Almost all  colorectal cancers start when polyp cells begin growing abnormally. As a cancerous tumor grows, it may involve more and more of the colon or rectum. In time, cancer can also grow beyond the colon or rectum and spread to nearby organs or to glands called lymph nodes. The cells can also travel to other parts of the body. This is known as metastasis. The earlier a cancerous tumor is removed, the better the chance of preventing its spread.        [id number] [facility], [address] [place] [address], [place] [address]. All rights reserved. This information is not intended as a substitute for professional medical care. Always follow your healthcare professional's instructions.

## 2021-06-10 LAB — COPATH REPORT: NORMAL

## 2021-09-04 ENCOUNTER — HEALTH MAINTENANCE LETTER (OUTPATIENT)
Age: 71
End: 2021-09-04

## 2021-12-09 ENCOUNTER — HOSPITAL ENCOUNTER (OUTPATIENT)
Dept: MAMMOGRAPHY | Facility: CLINIC | Age: 71
Discharge: HOME | End: 2021-12-09
Attending: INTERNAL MEDICINE | Admitting: INTERNAL MEDICINE
Payer: COMMERCIAL

## 2021-12-09 DIAGNOSIS — Z12.31 VISIT FOR SCREENING MAMMOGRAM: ICD-10-CM

## 2021-12-09 PROCEDURE — 77063 BREAST TOMOSYNTHESIS BI: CPT

## 2021-12-09 NOTE — PROCEDURE: JUVEDERM VOLBELLA INJECTION
Post-Care Instructions: -Ice provided post treatment for 2 to 5 minutes and or to take home. Patient instructed to apply ice 20 minutes on, 20 minutes off while awake for one to 1 to 2 days to reduce swelling. Avoid massage in the area. An over-the-counter antihistamine may be beneficial and was recommended to help with swelling. Patient instructed to notify clinic if any bruising worsens, travels or becomes painful. Include Pregnancy/Lactation Warning?: No

## 2022-02-19 ENCOUNTER — HEALTH MAINTENANCE LETTER (OUTPATIENT)
Age: 72
End: 2022-02-19

## 2022-03-15 ASSESSMENT — ENCOUNTER SYMPTOMS
JOINT SWELLING: 0
NAUSEA: 0
HEADACHES: 0
BREAST MASS: 0
CHILLS: 0
PALPITATIONS: 0
HEMATURIA: 0
HEMATOCHEZIA: 0
MYALGIAS: 0
ARTHRALGIAS: 0
EYE PAIN: 0
DIARRHEA: 0
DIZZINESS: 0
DYSURIA: 0
HEARTBURN: 0
CONSTIPATION: 0
NERVOUS/ANXIOUS: 0
SORE THROAT: 0
PARESTHESIAS: 0
COUGH: 0
SHORTNESS OF BREATH: 0
ABDOMINAL PAIN: 0
WEAKNESS: 0
FEVER: 0
FREQUENCY: 0

## 2022-03-15 ASSESSMENT — ACTIVITIES OF DAILY LIVING (ADL): CURRENT_FUNCTION: NO ASSISTANCE NEEDED

## 2022-03-18 ENCOUNTER — OFFICE VISIT (OUTPATIENT)
Dept: INTERNAL MEDICINE | Facility: CLINIC | Age: 72
End: 2022-03-18
Payer: COMMERCIAL

## 2022-03-18 VITALS
HEART RATE: 89 BPM | TEMPERATURE: 98.1 F | WEIGHT: 135.1 LBS | HEIGHT: 58 IN | OXYGEN SATURATION: 96 % | DIASTOLIC BLOOD PRESSURE: 62 MMHG | RESPIRATION RATE: 16 BRPM | SYSTOLIC BLOOD PRESSURE: 132 MMHG | BODY MASS INDEX: 28.36 KG/M2

## 2022-03-18 DIAGNOSIS — M85.89 OSTEOPENIA OF MULTIPLE SITES: ICD-10-CM

## 2022-03-18 DIAGNOSIS — H16.229 KERATOCONJUNCTIVITIS SICCA: ICD-10-CM

## 2022-03-18 DIAGNOSIS — Z00.00 ENCOUNTER FOR ANNUAL WELLNESS EXAM IN MEDICARE PATIENT: Primary | ICD-10-CM

## 2022-03-18 DIAGNOSIS — I10 BENIGN ESSENTIAL HYPERTENSION: ICD-10-CM

## 2022-03-18 DIAGNOSIS — E78.5 HYPERLIPIDEMIA LDL GOAL <130: ICD-10-CM

## 2022-03-18 PROCEDURE — 99214 OFFICE O/P EST MOD 30 MIN: CPT | Mod: 25 | Performed by: INTERNAL MEDICINE

## 2022-03-18 PROCEDURE — 82043 UR ALBUMIN QUANTITATIVE: CPT | Performed by: INTERNAL MEDICINE

## 2022-03-18 PROCEDURE — 80048 BASIC METABOLIC PNL TOTAL CA: CPT | Performed by: INTERNAL MEDICINE

## 2022-03-18 PROCEDURE — 36415 COLL VENOUS BLD VENIPUNCTURE: CPT | Performed by: INTERNAL MEDICINE

## 2022-03-18 PROCEDURE — 80061 LIPID PANEL: CPT | Performed by: INTERNAL MEDICINE

## 2022-03-18 PROCEDURE — 99397 PER PM REEVAL EST PAT 65+ YR: CPT | Performed by: INTERNAL MEDICINE

## 2022-03-18 RX ORDER — TRIAMTERENE AND HYDROCHLOROTHIAZIDE 37.5; 25 MG/1; MG/1
1 CAPSULE ORAL DAILY
Qty: 90 CAPSULE | Refills: 3 | Status: SHIPPED | OUTPATIENT
Start: 2022-03-18 | End: 2023-06-10

## 2022-03-18 ASSESSMENT — ENCOUNTER SYMPTOMS
BREAST MASS: 0
EYE PAIN: 0
SORE THROAT: 0
CHILLS: 0
NAUSEA: 0
PALPITATIONS: 0
DIZZINESS: 0
ARTHRALGIAS: 0
JOINT SWELLING: 0
DYSURIA: 0
FREQUENCY: 0
CONSTIPATION: 0
NERVOUS/ANXIOUS: 0
HEMATOCHEZIA: 0
HEMATURIA: 0
DIARRHEA: 0
HEADACHES: 0
FEVER: 0
ABDOMINAL PAIN: 0
SHORTNESS OF BREATH: 0
WEAKNESS: 0
MYALGIAS: 0
COUGH: 0
HEARTBURN: 0
PARESTHESIAS: 0

## 2022-03-18 ASSESSMENT — ACTIVITIES OF DAILY LIVING (ADL): CURRENT_FUNCTION: NO ASSISTANCE NEEDED

## 2022-03-18 NOTE — PROGRESS NOTES
"SUBJECTIVE:   Kymberly Espinosa is a 72 year old female who presents for Preventive Visit.      Patient has been advised of split billing requirements and indicates understanding: Yes  Are you in the first 12 months of your Medicare coverage?  No    Healthy Habits:     In general, how would you rate your overall health?  Good    Duration of exercise:  15-30 minutes    Do you usually eat at least 4 servings of fruit and vegetables a day, include whole grains    & fiber and avoid regularly eating high fat or \"junk\" foods?  Yes    Taking medications regularly:  Yes    Medication side effects:  None    Ability to successfully perform activities of daily living:  No assistance needed    Home Safety:  No safety concerns identified    Hearing Impairment:  No hearing concerns    In the past 6 months, have you been bothered by leaking of urine?  No    In general, how would you rate your overall mental or emotional health?  Excellent      PHQ-2 Total Score: 0    Additional concerns today:  No    Problems:  1.  Hypertension: She reports she checks her blood pressure regularly, always less than 140.  A few days ago it was 133/62, she feels it tends to go up when she needs come to the doctor.  2.  Dry eyes: Restasis was fairly expensive so using other eyedrops  3.  Hyperlipidemia: Diet is stable.  4.  Osteopenia: Last bone density 2 years ago was stable, did not require treatment.    Patient Active Problem List   Diagnosis     Benign essential hypertension     Hyperlipidemia LDL goal <130     Advanced directives, counseling/discussion     Keratoconjunctivitis sicca (H)     Current Outpatient Medications   Medication Sig Dispense Refill     Acai 25 MG CAPS        aspirin 162 MG EC tablet Take 81 mg by mouth daily        calcium carbonate (OS-MAGDI) 500 MG tablet Take 1 tablet by mouth daily        fish oil-omega-3 fatty acids 1000 MG capsule Take 2 g by mouth daily       multivitamin w/minerals (MULTI-VITAMIN) tablet Take 1 " tablet by mouth daily       SOOLANTRA 1 % cream   11     triamterene-HCTZ (DYAZIDE) 37.5-25 MG capsule Take 1 capsule by mouth daily 90 capsule 1     Vitamin D3 (CHOLECALCIFEROL) 125 MCG (5000 UT) tablet Take by mouth daily          Do you feel safe in your environment? Yes    Have you ever done Advance Care Planning? (For example, a Health Directive, POLST, or a discussion with a medical provider or your loved ones about your wishes): Yes, advance care planning is on file.       Fall risk  Fallen 2 or more times in the past year?: No  Any fall with injury in the past year?: No    Cognitive Screening   1) Repeat 3 items (Leader, Season, Table)    2) Clock draw: NORMAL  3) 3 item recall: Recalls 3 objects  Results: 3 items recalled: COGNITIVE IMPAIRMENT LESS LIKELY    Mini-CogTM Copyright S Paulo. Licensed by the author for use in [facility]; reprinted with permission (carol@[facility]). All rights reserved.      Do you have sleep apnea, excessive snoring or daytime drowsiness?: no    Reviewed and updated as needed this visit by clinical staff                  Reviewed and updated as needed this visit by Provider                 Social History     Tobacco Use     Smoking status: Former Smoker     Smokeless tobacco: Never Used   Substance Use Topics     Alcohol use: Yes     Comment: Casual     If you drink alcohol do you typically have >3 drinks per day or >7 drinks per week? Yes      Alcohol Use 3/15/2022   Prescreen: >3 drinks/day or >7 drinks/week? No   Prescreen: >3 drinks/day or >7 drinks/week? -   No flowsheet data found.            Current providers sharing in care for this patient include:   Patient Care Team:  Kimmie Hayes MD as PCP - General (Internal Medicine)  Kimmie Hayes MD as Assigned PCP    The following health maintenance items are reviewed in Epic and correct as of today:  Health Maintenance Due   Topic Date Due     ANNUAL REVIEW OF  ORDERS  Never done     LUNG CANCER SCREENING  Never  "done     ZOSTER IMMUNIZATION (3 of 3) 02/22/2021     MEDICARE ANNUAL WELLNESS VISIT  12/28/2021     LIPID  12/28/2021     MICROALBUMIN  12/28/2021     FALL RISK ASSESSMENT  12/28/2021               Review of Systems   Constitutional: Negative for chills and fever.   HENT: Negative for congestion, ear pain, hearing loss and sore throat.    Eyes: Negative for pain and visual disturbance.   Respiratory: Negative for cough and shortness of breath.    Cardiovascular: Negative for chest pain, palpitations and peripheral edema.   Gastrointestinal: Negative for abdominal pain, constipation, diarrhea, heartburn, hematochezia and nausea.   Breasts:  Negative for tenderness, breast mass and discharge.   Genitourinary: Negative for dysuria, frequency, genital sores, hematuria, pelvic pain, urgency, vaginal bleeding and vaginal discharge.   Musculoskeletal: Negative for arthralgias, joint swelling and myalgias.   Skin: Negative for rash.   Neurological: Negative for dizziness, weakness, headaches and paresthesias.   Psychiatric/Behavioral: Negative for mood changes. The patient is not nervous/anxious.          OBJECTIVE:   /62 (BP Location: Right arm, Patient Position: Sitting, Cuff Size: Adult Regular)   Pulse 89   Temp 98.1  F (36.7  C) (Oral)   Resp 16   Ht 1.473 m (4' 10\")   Wt 61.3 kg (135 lb 1.6 oz)   SpO2 96%   BMI 28.24 kg/m   Estimated body mass index is 27.94 kg/m  as calculated from the following:    Height as of 6/8/21: 1.486 m (4' 10.5\").    Weight as of 6/8/21: 61.7 kg (136 lb).  Physical Exam  HEENT: extraocular movements are intact, pupils equal and reactive to light and accommodation, TMs clear  NECK: Neck supple. No adenopathy. Thyroid symmetric, normal size,, Carotids without bruits.  PULMONARY: clear to auscultation  CARDIAC: regular rate and rhythm and no murmurs, clicks, or gallops  PULSES: 2/2 throughout  BACK: no spinal or CVAT  ABDOMINAL: Soft, nontender.  Normal bowel sounds.  No " "hepatosplenomegaly or abnormal sabina  REFLEXES: 1+ throughout      ASSESSMENT / PLAN:   (Z00.00) Encounter for annual wellness exam in Medicare patient  (primary encounter diagnosis)  Comment: Consider shingles vaccine, can check with pharmacy on cost as it may be covered better than it was before  Plan:     (M35.01) Keratoconjunctivitis sicca (H)  Comment: Stable  Plan: Continued drops, follow-up with eye doctor    (I10) Benign essential hypertension  Comment: Well-controlled  Plan: Albumin Random Urine Quantitative with Creat         Ratio, Basic metabolic panel  (Ca, Cl, CO2,         Creat, Gluc, K, Na, BUN)        Continue medication    (E78.5) Hyperlipidemia LDL goal <130  Comment: Recheck lab  Plan: Lipid panel reflex to direct LDL Fasting            (M85.89) Osteopenia of multiple sites  Comment: Stable  Plan: Bone density in 3 years    Patient has been advised of split billing requirements and indicates understanding: Yes    COUNSELING:  Reviewed preventive health counseling, as reflected in patient instructions    Estimated body mass index is 27.94 kg/m  as calculated from the following:    Height as of 6/8/21: 1.486 m (4' 10.5\").    Weight as of 6/8/21: 61.7 kg (136 lb).        She reports that she has quit smoking. She has never used smokeless tobacco.      Appropriate preventive services were discussed with this patient, including applicable screening as appropriate for cardiovascular disease, diabetes, osteopenia/osteoporosis, and glaucoma.  As appropriate for age/gender, discussed screening for colorectal cancer, prostate cancer, breast cancer, and cervical cancer. Checklist reviewing preventive services available has been given to the patient.    Reviewed patients plan of care and provided an AVS. The Basic Care Plan (routine screening as documented in Health Maintenance) for [place] meets the Care Plan requirement. This Care Plan has been established and reviewed with the Patient.    Counseling " Resources:  ATP IV Guidelines  Pooled Cohorts Equation Calculator  Breast Cancer Risk Calculator  Breast Cancer: Medication to Reduce Risk  FRAX Risk Assessment  ICSI Preventive Guidelines  Dietary Guidelines for Americans, 2010  [employer]'s MyPlate  ASA Prophylaxis  Lung CA Screening    Kimmie Hayes MD  [facility]    Identified Health Risks:

## 2022-03-18 NOTE — NURSING NOTE
"BP (!) 159/79 (BP Location: Right arm, Patient Position: Sitting, Cuff Size: Adult Regular)   Pulse 89   Temp 98.1  F (36.7  C) (Oral)   Resp 16   Ht 1.473 m (4' 10\")   Wt 61.3 kg (135 lb 1.6 oz)   SpO2 96%   BMI 28.24 kg/m      "

## 2022-03-19 LAB
ANION GAP SERPL CALCULATED.3IONS-SCNC: 9 MMOL/L (ref 3–14)
BUN SERPL-MCNC: 18 MG/DL (ref 7–30)
CALCIUM SERPL-MCNC: 9.6 MG/DL (ref 8.5–10.1)
CHLORIDE BLD-SCNC: 105 MMOL/L (ref 94–109)
CHOLEST SERPL-MCNC: 218 MG/DL
CO2 SERPL-SCNC: 26 MMOL/L (ref 20–32)
CREAT SERPL-MCNC: 0.77 MG/DL (ref 0.52–1.04)
FASTING STATUS PATIENT QL REPORTED: YES
GFR SERPL CREATININE-BSD FRML MDRD: 82 ML/MIN/1.73M2
GLUCOSE BLD-MCNC: 104 MG/DL (ref 70–99)
HDLC SERPL-MCNC: 41 MG/DL
LDLC SERPL CALC-MCNC: 133 MG/DL
NONHDLC SERPL-MCNC: 177 MG/DL
POTASSIUM BLD-SCNC: 3.7 MMOL/L (ref 3.4–5.3)
SODIUM SERPL-SCNC: 140 MMOL/L (ref 133–144)
TRIGL SERPL-MCNC: 222 MG/DL

## 2022-03-20 LAB
CREAT UR-MCNC: 75 MG/DL
MICROALBUMIN UR-MCNC: 14 MG/L
MICROALBUMIN/CREAT UR: 18.67 MG/G CR (ref 0–25)

## 2022-10-16 ENCOUNTER — HEALTH MAINTENANCE LETTER (OUTPATIENT)
Age: 72
End: 2022-10-16

## 2022-12-12 ENCOUNTER — HOSPITAL ENCOUNTER (OUTPATIENT)
Dept: MAMMOGRAPHY | Facility: CLINIC | Age: 72
Discharge: HOME | End: 2022-12-12
Attending: INTERNAL MEDICINE | Admitting: INTERNAL MEDICINE
Payer: COMMERCIAL

## 2022-12-12 DIAGNOSIS — Z12.31 BREAST CANCER SCREENING BY MAMMOGRAM: ICD-10-CM

## 2022-12-12 PROCEDURE — 77067 SCR MAMMO BI INCL CAD: CPT

## 2023-06-01 ENCOUNTER — HEALTH MAINTENANCE LETTER (OUTPATIENT)
Age: 73
End: 2023-06-01

## 2023-06-09 DIAGNOSIS — I10 BENIGN ESSENTIAL HYPERTENSION: ICD-10-CM

## 2023-06-10 RX ORDER — TRIAMTERENE AND HYDROCHLOROTHIAZIDE 37.5; 25 MG/1; MG/1
1 CAPSULE ORAL DAILY
Qty: 90 CAPSULE | Refills: 0 | Status: SHIPPED | OUTPATIENT
Start: 2023-06-10 | End: 2023-07-13

## 2023-06-11 NOTE — TELEPHONE ENCOUNTER
Pending Prescriptions:                       Disp   Refills    triamterene-HCTZ (DYAZIDE) 37.5-25 MG cap*90 cap*0            Sig: Take 1 capsule by mouth daily    Medication is being filled for 1 time refill only due to:  Patient needs to be seen because it has been more than one year since last visit.     Next 5 appointments (look out 90 days)    Jul 13, 2023  8:30 AM  (Arrive by 8:10 AM)  Annual Wellness Visit with Kimmie Hayes MD  [facility] ([facility] ) [address] [place]  [address]  [facility] [address] [phone]

## 2023-07-12 ASSESSMENT — ENCOUNTER SYMPTOMS
COUGH: 0
HEMATURIA: 0
FREQUENCY: 0
JOINT SWELLING: 0
DIZZINESS: 0
PARESTHESIAS: 0
CONSTIPATION: 0
SHORTNESS OF BREATH: 0
BREAST MASS: 0
NERVOUS/ANXIOUS: 0
PALPITATIONS: 0
HEARTBURN: 0
ABDOMINAL PAIN: 0
DYSURIA: 0
ARTHRALGIAS: 0
DIARRHEA: 0
HEMATOCHEZIA: 0
HEADACHES: 0
WEAKNESS: 0
EYE PAIN: 0
MYALGIAS: 0
CHILLS: 0
FEVER: 0
NAUSEA: 0
SORE THROAT: 0

## 2023-07-12 ASSESSMENT — ACTIVITIES OF DAILY LIVING (ADL): CURRENT_FUNCTION: NO ASSISTANCE NEEDED

## 2023-07-13 ENCOUNTER — OFFICE VISIT (OUTPATIENT)
Dept: INTERNAL MEDICINE | Facility: CLINIC | Age: 73
End: 2023-07-13
Payer: COMMERCIAL

## 2023-07-13 VITALS
WEIGHT: 138 LBS | HEART RATE: 76 BPM | TEMPERATURE: 98.3 F | OXYGEN SATURATION: 97 % | DIASTOLIC BLOOD PRESSURE: 76 MMHG | SYSTOLIC BLOOD PRESSURE: 136 MMHG | HEIGHT: 58 IN | RESPIRATION RATE: 16 BRPM | BODY MASS INDEX: 28.97 KG/M2

## 2023-07-13 DIAGNOSIS — Z00.00 ENCOUNTER FOR ANNUAL WELLNESS EXAM IN MEDICARE PATIENT: Primary | ICD-10-CM

## 2023-07-13 DIAGNOSIS — H16.229 KERATOCONJUNCTIVITIS SICCA: ICD-10-CM

## 2023-07-13 DIAGNOSIS — I10 BENIGN ESSENTIAL HYPERTENSION: ICD-10-CM

## 2023-07-13 DIAGNOSIS — E78.5 HYPERLIPIDEMIA LDL GOAL <130: ICD-10-CM

## 2023-07-13 LAB
ANION GAP SERPL CALCULATED.3IONS-SCNC: 13 MMOL/L (ref 7–15)
BUN SERPL-MCNC: 21.1 MG/DL (ref 8–23)
CALCIUM SERPL-MCNC: 9.9 MG/DL (ref 8.8–10.2)
CHLORIDE SERPL-SCNC: 103 MMOL/L (ref 98–107)
CHOLEST SERPL-MCNC: 195 MG/DL
CREAT SERPL-MCNC: 0.77 MG/DL (ref 0.51–0.95)
CREAT UR-MCNC: 117 MG/DL
DEPRECATED HCO3 PLAS-SCNC: 25 MMOL/L (ref 22–29)
GFR SERPL CREATININE-BSD FRML MDRD: 82 ML/MIN/1.73M2
GLUCOSE SERPL-MCNC: 99 MG/DL (ref 70–99)
HDLC SERPL-MCNC: 40 MG/DL
LDLC SERPL CALC-MCNC: 116 MG/DL
MICROALBUMIN UR-MCNC: <12 MG/L
MICROALBUMIN/CREAT UR: NORMAL MG/G{CREAT}
NONHDLC SERPL-MCNC: 155 MG/DL
POTASSIUM SERPL-SCNC: 3.7 MMOL/L (ref 3.4–5.3)
SODIUM SERPL-SCNC: 141 MMOL/L (ref 136–145)
TRIGL SERPL-MCNC: 195 MG/DL

## 2023-07-13 PROCEDURE — 82043 UR ALBUMIN QUANTITATIVE: CPT | Performed by: INTERNAL MEDICINE

## 2023-07-13 PROCEDURE — 82570 ASSAY OF URINE CREATININE: CPT | Performed by: INTERNAL MEDICINE

## 2023-07-13 PROCEDURE — 99214 OFFICE O/P EST MOD 30 MIN: CPT | Mod: 25 | Performed by: INTERNAL MEDICINE

## 2023-07-13 PROCEDURE — G0439 PPPS, SUBSEQ VISIT: HCPCS | Performed by: INTERNAL MEDICINE

## 2023-07-13 PROCEDURE — 80048 BASIC METABOLIC PNL TOTAL CA: CPT | Performed by: INTERNAL MEDICINE

## 2023-07-13 PROCEDURE — 80061 LIPID PANEL: CPT | Performed by: INTERNAL MEDICINE

## 2023-07-13 PROCEDURE — 36415 COLL VENOUS BLD VENIPUNCTURE: CPT | Performed by: INTERNAL MEDICINE

## 2023-07-13 RX ORDER — TRIAMTERENE AND HYDROCHLOROTHIAZIDE 37.5; 25 MG/1; MG/1
1 CAPSULE ORAL DAILY
Qty: 90 CAPSULE | Refills: 3 | Status: SHIPPED | OUTPATIENT
Start: 2023-07-13 | End: 2024-07-15

## 2023-07-13 RX ORDER — CYCLOSPORINE 0.5 MG/ML
1 EMULSION OPHTHALMIC 2 TIMES DAILY
COMMUNITY
Start: 2023-07-13 | End: 2024-07-15

## 2023-07-13 RX ORDER — VITAMIN E 268 MG
CAPSULE ORAL DAILY
COMMUNITY

## 2023-07-13 ASSESSMENT — ENCOUNTER SYMPTOMS
HEADACHES: 0
NAUSEA: 0
ARTHRALGIAS: 0
FREQUENCY: 0
ABDOMINAL PAIN: 0
HEARTBURN: 0
SHORTNESS OF BREATH: 0
BREAST MASS: 0
EYE PAIN: 0
MYALGIAS: 0
WEAKNESS: 0
DIZZINESS: 0
PALPITATIONS: 0
HEMATOCHEZIA: 0
COUGH: 0
FEVER: 0
CHILLS: 0
SORE THROAT: 0
NERVOUS/ANXIOUS: 0
PARESTHESIAS: 0
DYSURIA: 0
HEMATURIA: 0
CONSTIPATION: 0
DIARRHEA: 0
JOINT SWELLING: 0

## 2023-07-13 ASSESSMENT — ACTIVITIES OF DAILY LIVING (ADL): CURRENT_FUNCTION: NO ASSISTANCE NEEDED

## 2023-07-13 NOTE — PROGRESS NOTES
"SUBJECTIVE:   Kymberly is a 73 year old who presents for Preventive Visit.    Fasting.         7/13/2023     8:13 AM   Additional Questions   Roomed by ROSELIA Wolf LPN   Accompanied by none         7/13/2023     8:13 AM   Patient Reported Additional Medications   Patient reports taking the following new medications none     Are you in the first 12 months of your Medicare coverage?  No    Healthy Habits:     In general, how would you rate your overall health?  Good    Frequency of exercise:  2-3 days/week    Duration of exercise:  45-60 minutes    Do you usually eat at least 4 servings of fruit and vegetables a day, include whole grains    & fiber and avoid regularly eating high fat or \"junk\" foods?  Yes    Taking medications regularly:  Yes    Medication side effects:  None    Ability to successfully perform activities of daily living:  No assistance needed    Home Safety:  No safety concerns identified    Hearing Impairment:  No hearing concerns    In the past 6 months, have you been bothered by leaking of urine?  No    In general, how would you rate your overall mental or emotional health?  Excellent    Additional concerns today:  No      Problems:   1. HTN: she tends to check early am, some 140-150 but most 120-130/60-70. No concerns about medication.   2. Hyperlipidemia: diet stable   3. Dry eyes, restasis works well.   Patient Active Problem List   Diagnosis     Benign essential hypertension     Hyperlipidemia LDL goal <130     Advanced directives, counseling/discussion     Keratoconjunctivitis sicca (H)     Osteopenia of multiple sites     Current Outpatient Medications   Medication Sig Dispense Refill     calcium carbonate (OS-MAGDI) 500 MG tablet Take 1 tablet by mouth daily        multivitamin w/minerals (MULTI-VITAMIN) tablet Take 1 tablet by mouth daily       triamterene-HCTZ (DYAZIDE) 37.5-25 MG capsule Take 1 capsule by mouth daily 90 capsule 0     Vitamin D3 (CHOLECALCIFEROL) 125 MCG (5000 UT) tablet Take " by mouth daily       vitamin E (TOCOPHEROL) 400 units (180 mg) capsule Take by mouth daily       Have you ever done Advance Care Planning? (For example, a Health Directive, POLST, or a discussion with a medical provider or your loved ones about your wishes): Yes, advance care planning is on file.       Fall risk  Fallen 2 or more times in the past year?: No  Any fall with injury in the past year?: No    Cognitive Screening   1) Repeat 3 items (Leader, Season, Table)    2) Clock draw: NORMAL  3) 3 item recall: Recalls 3 objects  Results: 3 items recalled: COGNITIVE IMPAIRMENT LESS LIKELY    Mini-CogTM Copyright S Paulo. Licensed by the author for use in [facility]; reprinted with permission (soob@[facility]). All rights reserved.      Do you have sleep apnea, excessive snoring or daytime drowsiness?: no    Reviewed and updated as needed this visit by clinical staff   Tobacco  Allergies  Meds              Reviewed and updated as needed this visit by Provider                 Social History     Tobacco Use     Smoking status: Former     Packs/day: 0.25     Years: 27.00     Pack years: 6.75     Types: Cigarettes     Start date: 1971     Quit date: 1998     Years since quittin.5     Smokeless tobacco: Never   Substance Use Topics     Alcohol use: Yes     Comment: Casual             2023    10:56 AM   Alcohol Use   Prescreen: >3 drinks/day or >7 drinks/week? No     Do you have a current opioid prescription? No  Do you use any other controlled substances or medications that are not prescribed by a provider? Alcohol      Current providers sharing in care for this patient include:   Patient Care Team:  Kimmie Hayes MD as PCP - General (Internal Medicine)  Kimmie Hayes MD as Assigned PCP    The following health maintenance items are reviewed in Epic and correct as of today:  Health Maintenance   Topic Date Due     ANNUAL REVIEW OF  ORDERS  Never done     LUNG CANCER SCREENING  Never done  "    DTAP/TDAP/TD IMMUNIZATION (2 - Td or Tdap) 11/21/2022     COVID-19 Vaccine (6 - Pfizer series) 02/02/2023     MEDICARE ANNUAL WELLNESS VISIT  03/18/2023     LIPID  03/18/2023     MICROALBUMIN  03/18/2023     INFLUENZA VACCINE (1) 09/01/2023     MAMMO SCREENING  12/12/2023     FALL RISK ASSESSMENT  07/13/2024     DEXA  02/24/2025     COLORECTAL CANCER SCREENING  06/08/2026     ADVANCE CARE PLANNING  03/18/2027     HEPATITIS C SCREENING  Completed     PHQ-2 (once per calendar year)  Completed     Pneumococcal Vaccine: 65+ Years  Completed     ZOSTER IMMUNIZATION  Addressed     IPV IMMUNIZATION  Aged Out     MENINGITIS IMMUNIZATION  Aged Out       Review of Systems   Constitutional: Negative for chills and fever.   HENT: Negative for congestion, ear pain, hearing loss and sore throat.    Eyes: Negative for pain and visual disturbance.   Respiratory: Negative for cough and shortness of breath.    Cardiovascular: Negative for chest pain, palpitations and peripheral edema.   Gastrointestinal: Negative for abdominal pain, constipation, diarrhea, heartburn, hematochezia and nausea.   Breasts:  Negative for tenderness and breast mass.   Genitourinary: Negative for dysuria, frequency, genital sores, hematuria, pelvic pain, urgency, vaginal bleeding and vaginal discharge.   Musculoskeletal: Negative for arthralgias, joint swelling and myalgias.   Skin: Negative for rash.   Neurological: Negative for dizziness, weakness, headaches and paresthesias.   Psychiatric/Behavioral: Negative for mood changes. The patient is not nervous/anxious.        OBJECTIVE:   /76   Pulse 76   Temp 98.3  F (36.8  C)   Resp 16   Ht 1.473 m (4' 10\")   Wt 62.6 kg (138 lb)   SpO2 97%   Breastfeeding No   BMI 28.84 kg/m   Estimated body mass index is 28.24 kg/m  as calculated from the following:    Height as of 3/18/22: 1.473 m (4' 10\").    Weight as of 3/18/22: 61.3 kg (135 lb 1.6 oz).  Physical Exam    HEENT: extraocular movements are " intact, pupils equal and reactive to light and accommodation, TMs clear  NECK: Neck supple. No adenopathy. Thyroid symmetric, normal size,, Carotids without bruits.  PULMONARY: clear to auscultation  CARDIAC: regular rate and rhythm and no murmurs, clicks, or gallops  PULSES: 2/2 throughout  BACK: no spinal or CVAT  ABDOMINAL: Soft, nontender.  Normal bowel sounds.  No hepatosplenomegaly or abnormal masses        ASSESSMENT / PLAN:   (Z00.00) Encounter for annual wellness exam in Medicare patient  (primary encounter diagnosis)  Comment: recommend Td and Covid, she will consider later at the pharmacy.   Plan:     (M35.01) Keratoconjunctivitis sicca (H)  Comment: stable  Plan: per eye    (I10) Benign essential hypertension  Comment: controlled, check BP a little later in the day, continue med, call if most >140  Plan: triamterene-HCTZ (DYAZIDE) 37.5-25 MG capsule,         Basic metabolic panel  (Ca, Cl, CO2, Creat,         Gluc, K, Na, BUN), Albumin Random Urine         Quantitative with Creat Ratio, OFFICE/OUTPT         VISIT,EST,LEVL III            (E78.5) Hyperlipidemia LDL goal <130  Comment: stable diet   Plan: Lipid panel reflex to direct LDL Fasting              Patient has been advised of split billing requirements and indicates understanding: Yes      COUNSELING:  Reviewed preventive health counseling, as reflected in patient instructions        She reports that she quit smoking about 25 years ago. She started smoking about 52 years ago. She has a 6.75 pack-year smoking history. She has never used smokeless tobacco.      Appropriate preventive services were discussed with this patient, including applicable screening as appropriate for cardiovascular disease, diabetes, osteopenia/osteoporosis, and glaucoma.  As appropriate for age/gender, discussed screening for colorectal cancer, prostate cancer, breast cancer, and cervical cancer. Checklist reviewing preventive services available has been given to the  patient.    Reviewed patients plan of care and provided an AVS. The Basic Care Plan (routine screening as documented in Health Maintenance) for [place] meets the Care Plan requirement. This Care Plan has been established and reviewed with the Patient.      Kimmie Hayes MD  [facility]    Identified Health Risks:    I have reviewed Opioid Use Disorder and Substance Use Disorder risk factors and made any needed referrals.

## 2023-11-13 ENCOUNTER — PATIENT OUTREACH (OUTPATIENT)
Dept: CARE COORDINATION | Facility: CLINIC | Age: 73
End: 2023-11-13
Payer: COMMERCIAL

## 2023-11-28 ENCOUNTER — TRANSFERRED RECORDS (OUTPATIENT)
Dept: HEALTH INFORMATION MANAGEMENT | Facility: CLINIC | Age: 73
End: 2023-11-28
Payer: COMMERCIAL

## 2023-12-15 ENCOUNTER — HOSPITAL ENCOUNTER (OUTPATIENT)
Dept: MAMMOGRAPHY | Facility: CLINIC | Age: 73
Discharge: HOME | End: 2023-12-15
Attending: INTERNAL MEDICINE | Admitting: INTERNAL MEDICINE
Payer: COMMERCIAL

## 2023-12-15 DIAGNOSIS — Z12.31 VISIT FOR SCREENING MAMMOGRAM: ICD-10-CM

## 2023-12-15 PROCEDURE — 77067 SCR MAMMO BI INCL CAD: CPT

## 2024-06-25 ENCOUNTER — TRANSFERRED RECORDS (OUTPATIENT)
Dept: HEALTH INFORMATION MANAGEMENT | Facility: CLINIC | Age: 74
End: 2024-06-25
Payer: COMMERCIAL

## 2024-07-08 NOTE — PROGRESS NOTES
Preventive Care Visit  [facility], PFARZANEH Madrid MD, Family Medicine  Jul 15, 2024       SUBJECTIVE:   Kymberly is a 74 year old, presenting for the following:  No chief complaint on file.      HPI      Here for a physical.   New patient  Is on a medication for her blood pressure, needs this refilled.  Also nizoral for her rash under her breasts.              Social History     Tobacco Use    Smoking status: Former     Current packs/day: 0.00     Average packs/day: 0.3 packs/day for 27.0 years (6.8 ttl pk-yrs)     Types: Cigarettes     Start date: 1971     Quit date: 1998     Years since quittin.5    Smokeless tobacco: Never   Substance Use Topics    Alcohol use: Yes     Comment: Casual             2023    10:56 AM   Alcohol Use   Prescreen: >3 drinks/day or >7 drinks/week? No   No alcohol concerns.  Reviewed orders with patient.  Reviewed health maintenance and updated orders accordingly - Yes  BP Readings from Last 3 Encounters:   07/15/24 138/82   23 136/76   22 132/62    Wt Readings from Last 3 Encounters:   07/15/24 64.4 kg (142 lb)   23 62.6 kg (138 lb)   22 61.3 kg (135 lb 1.6 oz)                  Patient Active Problem List   Diagnosis    Benign essential hypertension    Hyperlipidemia LDL goal <130    ACP (advance care planning)    Keratoconjunctivitis sicca (H24)    Osteopenia of multiple sites    Health Care Home     Past Surgical History:   Procedure Laterality Date     SECTION      x1    COLONOSCOPY      ZZC NONSPECIFIC PROCEDURE      removal of basal cell ca leg       Social History     Tobacco Use    Smoking status: Former     Current packs/day: 0.00     Average packs/day: 0.3 packs/day for 27.0 years (6.8 ttl pk-yrs)     Types: Cigarettes     Start date: 1971     Quit date: 1998     Years since quittin.5     Passive exposure: Past    Smokeless tobacco: Never   Substance Use Topics    Alcohol use: Not  "Currently     Alcohol/week: 7.0 standard drinks of alcohol     Types: 7 Glasses of wine per week     Comment: wine with dinner     Family History   Problem Relation Age of Onset    Cerebrovascular Disease Mother     Coronary Artery Disease Brother 41    Colon Cancer No family hx of          Current Outpatient Medications   Medication Sig Dispense Refill    calcium carbonate (OS-MAGDI) 500 MG tablet Take 1 tablet by mouth daily       ketoconazole (NIZORAL) 2 % external cream Apply topically daily      multivitamin w/minerals (MULTI-VITAMIN) tablet Take 1 tablet by mouth daily      triamterene-HCTZ (DYAZIDE) 37.5-25 MG capsule Take 1 capsule by mouth daily 90 capsule 3    Vitamin D3 (CHOLECALCIFEROL) 125 MCG (5000 UT) tablet Take by mouth daily      vitamin E (TOCOPHEROL) 400 units (180 mg) capsule Take by mouth daily         Breast Cancer Screening:             Reviewed and updated as needed this visit by clinical staff                  Reviewed and updated as needed this visit by Provider                  Past Medical History:   Diagnosis Date    Basal cell carcinoma     left leg    Hemorrhoid     HTN (hypertension)     Hyperlipidemia     Rectal bleeding     Rosacea       Past Surgical History:   Procedure Laterality Date     SECTION      x1    COLONOSCOPY      ZZC NONSPECIFIC PROCEDURE      removal of basal cell ca leg     Review of Systems    Review of Systems  Constitutional, HEENT, cardiovascular, pulmonary, gi and gu systems are negative, except as otherwise noted.    OBJECTIVE:   There were no vitals taken for this visit.   Estimated body mass index is 28.84 kg/m  as calculated from the following:    Height as of 23: 1.473 m (4' 10\").    Weight as of 23: 62.6 kg (138 lb).  Physical Exam  GENERAL: alert and no distress  EYES: Eyes grossly normal to inspection, PERRL and conjunctivae and sclerae normal  HENT: ear canals and TM's normal, nose and mouth without ulcers or lesions  NECK: no " "adenopathy, no asymmetry, masses, or scars  RESP: lungs clear to auscultation - no rales, rhonchi or wheezes  BREAST: normal without masses, tenderness or nipple discharge and no palpable axillary masses or adenopathy  CV: regular rate and rhythm, normal S1 S2, no S3 or S4, no murmur, click or rub, no peripheral edema  ABDOMEN: soft, nontender, no hepatosplenomegaly, no masses and bowel sounds normal  MS: no gross musculoskeletal defects noted, no edema  SKIN: no suspicious lesions or rashes  NEURO: Normal strength and tone, mentation intact and speech normal  PSYCH: mentation appears normal, affect normal/bright  Patient declined pelvic exam    Diagnostic Test Results:  Labs reviewed in Epic  No results found for any visits on 07/15/24.    ASSESSMENT/PLAN:   1. Encounter for routine history and physical exam in female patient      2. Medicare annual wellness visit, subsequent  Completed.  - VENOUS COLLECTION  - Basic Metabolic Panel (BFP)  - Lipid Panel (BFP)    3. Health Care Home      4. Benign essential hypertension  Controlled on medications.  - triamterene-HCTZ (DYAZIDE) 37.5-25 MG capsule; Take 1 capsule by mouth daily  Dispense: 90 capsule; Refill: 1    5. Rash  She is followed by dermatology     Patient has been advised of split billing requirements and indicates understanding: Yes      Counseling  Reviewed preventive health counseling, as reflected in patient instructions       Regular exercise       Healthy diet/nutrition      BMI  Estimated body mass index is 28.84 kg/m  as calculated from the following:    Height as of 7/13/23: 1.473 m (4' 10\").    Weight as of 7/13/23: 62.6 kg (138 lb).         She reports that she quit smoking about 26 years ago. She started smoking about 53 years ago. She has a 6.8 pack-year smoking history. She has never used smokeless tobacco.          Signed Electronically by: Lisa Madrdi MD  "

## 2024-07-15 ENCOUNTER — OFFICE VISIT (OUTPATIENT)
Dept: FAMILY MEDICINE | Facility: CLINIC | Age: 74
End: 2024-07-15

## 2024-07-15 VITALS
WEIGHT: 142 LBS | SYSTOLIC BLOOD PRESSURE: 138 MMHG | HEART RATE: 65 BPM | BODY MASS INDEX: 28.63 KG/M2 | HEIGHT: 59 IN | DIASTOLIC BLOOD PRESSURE: 82 MMHG | OXYGEN SATURATION: 96 % | TEMPERATURE: 98 F

## 2024-07-15 DIAGNOSIS — I10 BENIGN ESSENTIAL HYPERTENSION: ICD-10-CM

## 2024-07-15 DIAGNOSIS — Z76.89 HEALTH CARE HOME: ICD-10-CM

## 2024-07-15 DIAGNOSIS — Z00.00 MEDICARE ANNUAL WELLNESS VISIT, SUBSEQUENT: ICD-10-CM

## 2024-07-15 DIAGNOSIS — Z00.00 ENCOUNTER FOR ROUTINE HISTORY AND PHYSICAL EXAM IN FEMALE PATIENT: Primary | ICD-10-CM

## 2024-07-15 DIAGNOSIS — R21 RASH: ICD-10-CM

## 2024-07-15 LAB
BUN SERPL-MCNC: 21 MG/DL (ref 7–25)
BUN/CREATININE RATIO: 26 (ref 6–32)
CALCIUM SERPL-MCNC: 9.3 MG/DL (ref 8.6–10.3)
CHLORIDE SERPLBLD-SCNC: 104.2 MMOL/L (ref 98–110)
CHOLEST SERPL-MCNC: 223 MG/DL (ref 0–199)
CHOLEST/HDLC SERPL: 4 {RATIO} (ref 0–5)
CO2 SERPL-SCNC: 28 MMOL/L (ref 20–32)
CREAT SERPL-MCNC: 0.8 MG/DL (ref 0.6–1.3)
GLUCOSE SERPL-MCNC: 92 MG/DL (ref 60–99)
HDLC SERPL-MCNC: 57 MG/DL (ref 40–150)
LDLC SERPL CALC-MCNC: 133 MG/DL
POTASSIUM SERPL-SCNC: 3.71 MMOL/L (ref 3.5–5.3)
SODIUM SERPL-SCNC: 139.7 MMOL/L (ref 135–146)
TRIGL SERPL-MCNC: 165 MG/DL (ref 0–149)

## 2024-07-15 PROCEDURE — G0439 PPPS, SUBSEQ VISIT: HCPCS | Performed by: FAMILY MEDICINE

## 2024-07-15 PROCEDURE — 99387 INIT PM E/M NEW PAT 65+ YRS: CPT | Mod: 25 | Performed by: FAMILY MEDICINE

## 2024-07-15 PROCEDURE — 80048 BASIC METABOLIC PNL TOTAL CA: CPT | Performed by: FAMILY MEDICINE

## 2024-07-15 PROCEDURE — 36415 COLL VENOUS BLD VENIPUNCTURE: CPT | Performed by: FAMILY MEDICINE

## 2024-07-15 PROCEDURE — 80061 LIPID PANEL: CPT | Performed by: FAMILY MEDICINE

## 2024-07-15 RX ORDER — KETOCONAZOLE 20 MG/G
CREAM TOPICAL DAILY
Qty: 60 G | Refills: 1 | Status: CANCELLED | OUTPATIENT
Start: 2024-07-15

## 2024-07-15 RX ORDER — KETOCONAZOLE 20 MG/G
CREAM TOPICAL DAILY
COMMUNITY
Start: 2023-10-04

## 2024-07-15 RX ORDER — TRIAMTERENE AND HYDROCHLOROTHIAZIDE 37.5; 25 MG/1; MG/1
1 CAPSULE ORAL DAILY
Qty: 90 CAPSULE | Refills: 1 | Status: SHIPPED | OUTPATIENT
Start: 2024-07-15

## 2024-07-15 NOTE — PROGRESS NOTES
Kymberly Espinosa is a 74 year old female who presents for Medicare Annual Wellness Visit.    Current providers caring for this patient include:  Patient Care Team:  Lisa Madird MD as PCP - General (Family Medicine)  Clinic - [facility] as Assigned PCP    Complete Medical and Social history reviewed with patient, outlined below.    Patient Active Problem List   Diagnosis    Benign essential hypertension    Hyperlipidemia LDL goal <130    ACP (advance care planning)    Keratoconjunctivitis sicca (H24)    Osteopenia of multiple sites    Health Care Home       Past Medical History:   Diagnosis Date    Basal cell carcinoma     left leg    Hemorrhoid     HTN (hypertension)     Hyperlipidemia     Rectal bleeding     Rosacea        Past Surgical History:   Procedure Laterality Date     SECTION      x1    COLONOSCOPY      ZZC NONSPECIFIC PROCEDURE      removal of basal cell ca leg       Family History   Problem Relation Age of Onset    Cerebrovascular Disease Mother     Coronary Artery Disease Brother 41    Colon Cancer No family hx of        Social History     Tobacco Use    Smoking status: Former     Current packs/day: 0.00     Average packs/day: 0.3 packs/day for 27.0 years (6.8 ttl pk-yrs)     Types: Cigarettes     Start date: 1971     Quit date: 1998     Years since quittin.5     Passive exposure: Past    Smokeless tobacco: Never   Substance Use Topics    Alcohol use: Not Currently     Alcohol/week: 7.0 standard drinks of alcohol     Types: 7 Glasses of wine per week     Comment: wine with dinner       Diet: regular, low salt/low fat  Physical Activity: patient exercises 4 times weekly, walking, yoga at the [facility]  Depression Screen:    Over the past 2 weeks, patient has felt down, depressed, or hopeless:  No    Over the past 2 weeks, patient has felt little interest or pleasure in doing things: No    Functional ability/Safety screen:  Up and go test (able to get up and  "walk longer than 30 seconds): Passed  Patient needs assistance with: nothing  Patient's home has the following possible safety concerns: none identified  Patient has concerns about her hearing:  No  Cognitive Screen  Patient repeats three objects (ball, flag, tree)      Clock drawing test:   NORMAL  Recalls three objects after 3 minutes (ball,flag,tree):     recalls 3 objects (3 points)    Physical Exam:  /82 (BP Location: Right arm, Patient Position: Sitting, Cuff Size: Adult Regular)   Pulse 65   Temp 98  F (36.7  C) (Temporal)   Ht 1.486 m (4' 10.5\")   Wt 64.4 kg (142 lb)   SpO2 96%   BMI 29.17 kg/m     Body mass index is 29.17 kg/m .     Health Maintenance   Topic Date Due    RSV VACCINE (Pregnancy & 60+) (1 - 1-dose 60+ series) Never done    COVID-19 Vaccine (7 - 2023-24 season) 03/07/2024    LIPID  07/13/2024    MICROALBUMIN  07/13/2024    MEDICARE ANNUAL WELLNESS VISIT  07/13/2024    DTAP/TDAP/TD IMMUNIZATION (2 - Td or Tdap) 07/15/2025 (Originally 11/21/2022)    INFLUENZA VACCINE (1) 09/01/2024    MAMMO SCREENING  12/15/2024    DEXA  02/24/2025    FALL RISK ASSESSMENT  07/15/2025    COLORECTAL CANCER SCREENING  06/08/2026    GLUCOSE  07/13/2026    ADVANCE CARE PLANNING  07/13/2028    HEPATITIS C SCREENING  Completed    PHQ-2 (once per calendar year)  Completed    Pneumococcal Vaccine: 65+ Years  Completed    ZOSTER IMMUNIZATION  Addressed    IPV IMMUNIZATION  Aged Out    HPV IMMUNIZATION  Aged Out    MENINGITIS IMMUNIZATION  Aged Out    RSV MONOCLONAL ANTIBODY  Aged Out         End of Life Planning:   Patient currently has an advanced directive: Yes.  Practitioner is supportive of decision.    Education/Counseling:   Based on review of the above information, the following items were addressed:      Healthy diet and regular exercise    Appropriate preventive services were discussed with this patient, including applicable screening as appropriate for cardiovascular disease, diabetes, " osteopenia/osteoporosis, and glaucoma.  As appropriate for age/gender, discussed screening for colorectal cancer, prostate cancer, breast cancer, and cervical cancer.   Checklist reviewing preventive services available has been given to the patient.    HM reviewed and up to date

## 2024-07-15 NOTE — PATIENT INSTRUCTIONS
Health Maintenance   Topic Date Due    RSV VACCINE (Pregnancy & 60+) (1 - 1-dose 60+ series) Never done    COVID-19 Vaccine (7 - 2023-24 season) 03/07/2024    LIPID  07/13/2024    MICROALBUMIN  07/13/2024    MEDICARE ANNUAL WELLNESS VISIT  07/13/2024    DTAP/TDAP/TD IMMUNIZATION (2 - Td or Tdap) 07/15/2025 (Originally 11/21/2022)    INFLUENZA VACCINE (1) 09/01/2024    MAMMO SCREENING  12/15/2024    DEXA  02/24/2025    FALL RISK ASSESSMENT  07/15/2025    COLORECTAL CANCER SCREENING  06/08/2026    GLUCOSE  07/13/2026    ADVANCE CARE PLANNING  07/13/2028    HEPATITIS C SCREENING  Completed    PHQ-2 (once per calendar year)  Completed    Pneumococcal Vaccine: 65+ Years  Completed    ZOSTER IMMUNIZATION  Addressed    IPV IMMUNIZATION  Aged Out    HPV IMMUNIZATION  Aged Out    MENINGITIS IMMUNIZATION  Aged Out    RSV MONOCLONAL ANTIBODY  Aged Out

## 2024-07-15 NOTE — NURSING NOTE
Chief Complaint   Patient presents with    New Patient     New patient to our clinic    Physical     Fasting today     Recheck Medication     Refill medication     Pre-visit Screening:  Immunizations:  not up to date - tdap at pharmacy  Colonoscopy:  is up to date  Mammogram: is up to date  Asthma Action Test/Plan:  NA  PHQ9:  NA  GAD7:  NA  Questioned patient about current smoking habits Pt. quit smoking some time ago.  Ok to leave detailed message on voice mail for today's visit only Yes, phone # [phone]

## 2024-08-22 ENCOUNTER — TRANSFERRED RECORDS (OUTPATIENT)
Dept: HEALTH INFORMATION MANAGEMENT | Facility: CLINIC | Age: 74
End: 2024-08-22
Payer: COMMERCIAL

## 2024-12-16 ENCOUNTER — HOSPITAL ENCOUNTER (OUTPATIENT)
Dept: MAMMOGRAPHY | Facility: CLINIC | Age: 74
Discharge: HOME | End: 2024-12-16
Attending: FAMILY MEDICINE | Admitting: FAMILY MEDICINE
Payer: COMMERCIAL

## 2024-12-16 DIAGNOSIS — Z12.31 VISIT FOR SCREENING MAMMOGRAM: ICD-10-CM

## 2024-12-16 PROCEDURE — 77063 BREAST TOMOSYNTHESIS BI: CPT

## 2024-12-16 PROCEDURE — 77067 SCR MAMMO BI INCL CAD: CPT

## 2025-03-12 DIAGNOSIS — I10 BENIGN ESSENTIAL HYPERTENSION: ICD-10-CM

## 2025-03-12 RX ORDER — TRIAMTERENE AND HYDROCHLOROTHIAZIDE 37.5; 25 MG/1; MG/1
1 CAPSULE ORAL DAILY
COMMUNITY
Start: 2025-03-12

## 2025-03-12 NOTE — TELEPHONE ENCOUNTER
Kymberly Espinosa is requesting a refill of:    Refused Prescriptions:                       Disp   Refills    triamterene-HCTZ (DYAZIDE) 37.5-25 MG caps*                Sig: Take 1 capsule by mouth daily  Refused By: SULLY TAPIA  Reason for Refusal: Patient needs appointment    Pt due for OV

## 2025-03-24 ENCOUNTER — TRANSFERRED RECORDS (OUTPATIENT)
Dept: HEALTH INFORMATION MANAGEMENT | Facility: CLINIC | Age: 75
End: 2025-03-24
Payer: COMMERCIAL

## 2025-05-19 ENCOUNTER — APPOINTMENT (OUTPATIENT)
Dept: URBAN - METROPOLITAN AREA CLINIC 253 | Age: 75
Setting detail: DERMATOLOGY
End: 2025-05-21

## 2025-05-19 VITALS — WEIGHT: 140 LBS | HEIGHT: 58 IN | RESPIRATION RATE: 14 BRPM

## 2025-05-19 DIAGNOSIS — D18.0 HEMANGIOMA: ICD-10-CM

## 2025-05-19 DIAGNOSIS — L82.1 OTHER SEBORRHEIC KERATOSIS: ICD-10-CM

## 2025-05-19 DIAGNOSIS — L80 VITILIGO: ICD-10-CM

## 2025-05-19 DIAGNOSIS — Z71.89 OTHER SPECIFIED COUNSELING: ICD-10-CM

## 2025-05-19 DIAGNOSIS — L81.4 OTHER MELANIN HYPERPIGMENTATION: ICD-10-CM

## 2025-05-19 DIAGNOSIS — D22 MELANOCYTIC NEVI: ICD-10-CM

## 2025-05-19 DIAGNOSIS — D49.2 NEOPLASM OF UNSPECIFIED BEHAVIOR OF BONE, SOFT TISSUE, AND SKIN: ICD-10-CM

## 2025-05-19 PROBLEM — D23.5 OTHER BENIGN NEOPLASM OF SKIN OF TRUNK: Status: ACTIVE | Noted: 2025-05-19

## 2025-05-19 PROBLEM — D22.5 MELANOCYTIC NEVI OF TRUNK: Status: ACTIVE | Noted: 2025-05-19

## 2025-05-19 PROBLEM — D18.01 HEMANGIOMA OF SKIN AND SUBCUTANEOUS TISSUE: Status: ACTIVE | Noted: 2025-05-19

## 2025-05-19 PROCEDURE — OTHER BIOPSY BY SHAVE METHOD: OTHER

## 2025-05-19 PROCEDURE — OTHER COUNSELING: OTHER

## 2025-05-19 PROCEDURE — OTHER MIPS QUALITY: OTHER

## 2025-05-19 PROCEDURE — 11103 TANGNTL BX SKIN EA SEP/ADDL: CPT

## 2025-05-19 PROCEDURE — 99203 OFFICE O/P NEW LOW 30 MIN: CPT | Mod: 25

## 2025-05-19 PROCEDURE — OTHER PHOTO-DOCUMENTATION: OTHER

## 2025-05-19 PROCEDURE — 11102 TANGNTL BX SKIN SINGLE LES: CPT

## 2025-05-19 ASSESSMENT — LOCATION DETAILED DESCRIPTION DERM
LOCATION DETAILED: LEFT POSTERIOR AXILLA
LOCATION DETAILED: EPIGASTRIC SKIN
LOCATION DETAILED: SUPERIOR LUMBAR SPINE
LOCATION DETAILED: RIGHT ANTERIOR PROXIMAL THIGH
LOCATION DETAILED: RIGHT RIB CAGE
LOCATION DETAILED: LEFT MEDIAL BREAST 10-11:00 REGION
LOCATION DETAILED: INFERIOR THORACIC SPINE
LOCATION DETAILED: LEFT MEDIAL BREAST 6-7:00 REGION
LOCATION DETAILED: LEFT MEDIAL SUPERIOR CHEST
LOCATION DETAILED: INFERIOR LUMBAR SPINE

## 2025-05-19 ASSESSMENT — LOCATION SIMPLE DESCRIPTION DERM
LOCATION SIMPLE: RIGHT THIGH
LOCATION SIMPLE: LEFT BREAST
LOCATION SIMPLE: CHEST
LOCATION SIMPLE: ABDOMEN
LOCATION SIMPLE: UPPER BACK
LOCATION SIMPLE: LOWER BACK
LOCATION SIMPLE: LEFT POSTERIOR AXILLA

## 2025-05-19 ASSESSMENT — LOCATION ZONE DERM
LOCATION ZONE: AXILLAE
LOCATION ZONE: TRUNK
LOCATION ZONE: LEG

## 2025-05-19 ASSESSMENT — BSA VITILIGO: % BODY COVERED IN VITILIGO: 11

## 2025-06-05 ENCOUNTER — APPOINTMENT (OUTPATIENT)
Dept: URBAN - METROPOLITAN AREA CLINIC 253 | Age: 75
Setting detail: DERMATOLOGY
End: 2025-06-05

## 2025-06-05 VITALS — HEIGHT: 58 IN | RESPIRATION RATE: 14 BRPM | WEIGHT: 140 LBS

## 2025-06-05 DIAGNOSIS — L57.0 ACTINIC KERATOSIS: ICD-10-CM

## 2025-06-05 PROCEDURE — OTHER MIPS QUALITY: OTHER

## 2025-06-05 PROCEDURE — OTHER LIQUID NITROGEN: OTHER

## 2025-06-05 PROCEDURE — 17003 DESTRUCT PREMALG LES 2-14: CPT

## 2025-06-05 PROCEDURE — OTHER ADDITIONAL NOTES: OTHER

## 2025-06-05 PROCEDURE — 17000 DESTRUCT PREMALG LESION: CPT

## 2025-06-05 PROCEDURE — OTHER COUNSELING: OTHER

## 2025-06-05 ASSESSMENT — LOCATION DETAILED DESCRIPTION DERM
LOCATION DETAILED: LEFT MEDIAL BREAST 10-11:00 REGION
LOCATION DETAILED: LEFT MEDIAL SUPERIOR CHEST

## 2025-06-05 ASSESSMENT — LOCATION SIMPLE DESCRIPTION DERM
LOCATION SIMPLE: LEFT BREAST
LOCATION SIMPLE: CHEST

## 2025-06-05 ASSESSMENT — LOCATION ZONE DERM: LOCATION ZONE: TRUNK

## 2025-07-21 ENCOUNTER — TRANSFERRED RECORDS (OUTPATIENT)
Dept: HEALTH INFORMATION MANAGEMENT | Facility: CLINIC | Age: 75
End: 2025-07-21
Payer: COMMERCIAL

## 2025-08-16 ENCOUNTER — HEALTH MAINTENANCE LETTER (OUTPATIENT)
Age: 75
End: 2025-08-16

## 2025-08-26 ENCOUNTER — OFFICE VISIT (OUTPATIENT)
Dept: FAMILY MEDICINE | Facility: CLINIC | Age: 75
End: 2025-08-26

## 2025-08-26 VITALS
HEART RATE: 68 BPM | TEMPERATURE: 98 F | RESPIRATION RATE: 20 BRPM | WEIGHT: 136.6 LBS | BODY MASS INDEX: 27.54 KG/M2 | DIASTOLIC BLOOD PRESSURE: 74 MMHG | SYSTOLIC BLOOD PRESSURE: 166 MMHG | HEIGHT: 59 IN

## 2025-08-26 DIAGNOSIS — I10 BENIGN ESSENTIAL HYPERTENSION: Primary | ICD-10-CM

## 2025-08-26 PROCEDURE — 99213 OFFICE O/P EST LOW 20 MIN: CPT | Performed by: FAMILY MEDICINE

## 2025-08-26 PROCEDURE — G2211 COMPLEX E/M VISIT ADD ON: HCPCS | Performed by: FAMILY MEDICINE

## 2025-08-26 RX ORDER — AMLODIPINE BESYLATE 5 MG/1
5 TABLET ORAL DAILY
Qty: 30 TABLET | Refills: 1 | Status: SHIPPED | OUTPATIENT
Start: 2025-08-26

## 2025-08-26 RX ORDER — LOSARTAN POTASSIUM 25 MG/1
100 TABLET ORAL DAILY
COMMUNITY
End: 2025-08-26 | Stop reason: ALTCHOICE

## 2025-08-26 RX ORDER — HYDRALAZINE HYDROCHLORIDE 25 MG/1
1 TABLET, FILM COATED ORAL
COMMUNITY
Start: 2025-08-15 | End: 2025-08-26

## 2025-08-26 RX ORDER — CHLORTHALIDONE 25 MG/1
25 TABLET ORAL DAILY
COMMUNITY
End: 2025-08-26 | Stop reason: DRUGHIGH

## 2025-08-26 RX ORDER — LOSARTAN POTASSIUM AND HYDROCHLOROTHIAZIDE 25; 100 MG/1; MG/1
1 TABLET ORAL DAILY
COMMUNITY
Start: 2025-08-12

## 2025-08-26 RX ORDER — POTASSIUM CHLORIDE 750 MG/1
10 TABLET, EXTENDED RELEASE ORAL DAILY
COMMUNITY
Start: 2025-07-15

## (undated) DEVICE — ENDO SNARE POLYPECTOMY OVAL 15MM LOOP SD-240U-15

## (undated) DEVICE — KIT ENDO TURNOVER/PROCEDURE W/CLEAN A SCOPE LINERS 103888

## (undated) DEVICE — ESU GROUND PAD ADULT W/CORD E7507

## (undated) DEVICE — ENDO TRAP POLYP QUICK CATCH 710201

## (undated) RX ORDER — SIMETHICONE 40MG/0.6ML
SUSPENSION, DROPS(FINAL DOSAGE FORM)(ML) ORAL
Status: DISPENSED
Start: 2021-06-08

## (undated) RX ORDER — FENTANYL CITRATE 50 UG/ML
INJECTION, SOLUTION INTRAMUSCULAR; INTRAVENOUS
Status: DISPENSED
Start: 2021-06-08

## (undated) RX ORDER — FENTANYL CITRATE 50 UG/ML
INJECTION, SOLUTION INTRAMUSCULAR; INTRAVENOUS
Status: DISPENSED
Start: 2018-04-24